# Patient Record
Sex: MALE | Race: BLACK OR AFRICAN AMERICAN | NOT HISPANIC OR LATINO | Employment: STUDENT | URBAN - METROPOLITAN AREA
[De-identification: names, ages, dates, MRNs, and addresses within clinical notes are randomized per-mention and may not be internally consistent; named-entity substitution may affect disease eponyms.]

---

## 2019-12-04 ENCOUNTER — OFFICE VISIT (OUTPATIENT)
Dept: FAMILY MEDICINE | Facility: CLINIC | Age: 19
End: 2019-12-04
Payer: COMMERCIAL

## 2019-12-04 ENCOUNTER — LAB VISIT (OUTPATIENT)
Dept: LAB | Facility: HOSPITAL | Age: 19
End: 2019-12-04
Attending: FAMILY MEDICINE
Payer: COMMERCIAL

## 2019-12-04 VITALS
TEMPERATURE: 98 F | WEIGHT: 156.31 LBS | BODY MASS INDEX: 22.38 KG/M2 | SYSTOLIC BLOOD PRESSURE: 122 MMHG | DIASTOLIC BLOOD PRESSURE: 82 MMHG | OXYGEN SATURATION: 96 % | HEART RATE: 68 BPM | HEIGHT: 70 IN

## 2019-12-04 DIAGNOSIS — Z00.00 ANNUAL PHYSICAL EXAM: Primary | ICD-10-CM

## 2019-12-04 DIAGNOSIS — Z23 ENCOUNTER FOR ADMINISTRATION OF VACCINE: ICD-10-CM

## 2019-12-04 DIAGNOSIS — Z11.3 SCREEN FOR STD (SEXUALLY TRANSMITTED DISEASE): ICD-10-CM

## 2019-12-04 DIAGNOSIS — J30.89 NON-SEASONAL ALLERGIC RHINITIS DUE TO OTHER ALLERGIC TRIGGER: ICD-10-CM

## 2019-12-04 DIAGNOSIS — Z00.00 ANNUAL PHYSICAL EXAM: ICD-10-CM

## 2019-12-04 LAB
ALBUMIN SERPL BCP-MCNC: 4.8 G/DL (ref 3.5–5.2)
ALP SERPL-CCNC: 56 U/L (ref 55–135)
ALT SERPL W/O P-5'-P-CCNC: 5 U/L (ref 10–44)
ANION GAP SERPL CALC-SCNC: 12 MMOL/L (ref 8–16)
AST SERPL-CCNC: 15 U/L (ref 10–40)
BILIRUB SERPL-MCNC: 1 MG/DL (ref 0.1–1)
BUN SERPL-MCNC: 15 MG/DL (ref 6–20)
CALCIUM SERPL-MCNC: 9.8 MG/DL (ref 8.7–10.5)
CHLORIDE SERPL-SCNC: 104 MMOL/L (ref 95–110)
CHOLEST SERPL-MCNC: 228 MG/DL (ref 120–199)
CHOLEST/HDLC SERPL: 4.5 {RATIO} (ref 2–5)
CO2 SERPL-SCNC: 24 MMOL/L (ref 23–29)
CREAT SERPL-MCNC: 1.1 MG/DL (ref 0.5–1.4)
ERYTHROCYTE [DISTWIDTH] IN BLOOD BY AUTOMATED COUNT: 11.4 % (ref 11.5–14.5)
EST. GFR  (AFRICAN AMERICAN): >60 ML/MIN/1.73 M^2
EST. GFR  (NON AFRICAN AMERICAN): >60 ML/MIN/1.73 M^2
ESTIMATED AVG GLUCOSE: 94 MG/DL (ref 68–131)
GLUCOSE SERPL-MCNC: 77 MG/DL (ref 70–110)
HBA1C MFR BLD HPLC: 4.9 % (ref 4–5.6)
HCT VFR BLD AUTO: 48.7 % (ref 40–54)
HDLC SERPL-MCNC: 51 MG/DL (ref 40–75)
HDLC SERPL: 22.4 % (ref 20–50)
HGB BLD-MCNC: 15.8 G/DL (ref 14–18)
LDLC SERPL CALC-MCNC: 164.2 MG/DL (ref 63–159)
MCH RBC QN AUTO: 30.5 PG (ref 27–31)
MCHC RBC AUTO-ENTMCNC: 32.4 G/DL (ref 32–36)
MCV RBC AUTO: 94 FL (ref 82–98)
NONHDLC SERPL-MCNC: 177 MG/DL
PLATELET # BLD AUTO: 189 K/UL (ref 150–350)
PMV BLD AUTO: 10.4 FL (ref 9.2–12.9)
POTASSIUM SERPL-SCNC: 4.1 MMOL/L (ref 3.5–5.1)
PROT SERPL-MCNC: 8.2 G/DL (ref 6–8.4)
RBC # BLD AUTO: 5.18 M/UL (ref 4.6–6.2)
SODIUM SERPL-SCNC: 140 MMOL/L (ref 136–145)
TRIGL SERPL-MCNC: 64 MG/DL (ref 30–150)
WBC # BLD AUTO: 4.38 K/UL (ref 3.9–12.7)

## 2019-12-04 PROCEDURE — 90651 9VHPV VACCINE 2/3 DOSE IM: CPT | Mod: S$GLB,,, | Performed by: FAMILY MEDICINE

## 2019-12-04 PROCEDURE — 86592 SYPHILIS TEST NON-TREP QUAL: CPT

## 2019-12-04 PROCEDURE — 99999 PR PBB SHADOW E&M-NEW PATIENT-LVL III: CPT | Mod: PBBFAC,,, | Performed by: FAMILY MEDICINE

## 2019-12-04 PROCEDURE — 90686 IIV4 VACC NO PRSV 0.5 ML IM: CPT | Mod: S$GLB,,, | Performed by: FAMILY MEDICINE

## 2019-12-04 PROCEDURE — 99385 PREV VISIT NEW AGE 18-39: CPT | Mod: 25,S$GLB,, | Performed by: FAMILY MEDICINE

## 2019-12-04 PROCEDURE — 90472 HPV VACCINE 9-VALENT 3 DOSE IM: ICD-10-PCS | Mod: S$GLB,,, | Performed by: FAMILY MEDICINE

## 2019-12-04 PROCEDURE — 90471 FLU VACCINE (QUAD) GREATER THAN OR EQUAL TO 3YO PRESERVATIVE FREE IM: ICD-10-PCS | Mod: S$GLB,,, | Performed by: FAMILY MEDICINE

## 2019-12-04 PROCEDURE — 83036 HEMOGLOBIN GLYCOSYLATED A1C: CPT

## 2019-12-04 PROCEDURE — 80061 LIPID PANEL: CPT

## 2019-12-04 PROCEDURE — 90472 IMMUNIZATION ADMIN EACH ADD: CPT | Mod: S$GLB,,, | Performed by: FAMILY MEDICINE

## 2019-12-04 PROCEDURE — 86803 HEPATITIS C AB TEST: CPT

## 2019-12-04 PROCEDURE — 80053 COMPREHEN METABOLIC PANEL: CPT

## 2019-12-04 PROCEDURE — 86703 HIV-1/HIV-2 1 RESULT ANTBDY: CPT

## 2019-12-04 PROCEDURE — 99999 PR PBB SHADOW E&M-NEW PATIENT-LVL III: ICD-10-PCS | Mod: PBBFAC,,, | Performed by: FAMILY MEDICINE

## 2019-12-04 PROCEDURE — 99385 PR PREVENTIVE VISIT,NEW,18-39: ICD-10-PCS | Mod: 25,S$GLB,, | Performed by: FAMILY MEDICINE

## 2019-12-04 PROCEDURE — 90686 FLU VACCINE (QUAD) GREATER THAN OR EQUAL TO 3YO PRESERVATIVE FREE IM: ICD-10-PCS | Mod: S$GLB,,, | Performed by: FAMILY MEDICINE

## 2019-12-04 PROCEDURE — 36415 COLL VENOUS BLD VENIPUNCTURE: CPT | Mod: PO

## 2019-12-04 PROCEDURE — 90471 IMMUNIZATION ADMIN: CPT | Mod: S$GLB,,, | Performed by: FAMILY MEDICINE

## 2019-12-04 PROCEDURE — 85027 COMPLETE CBC AUTOMATED: CPT

## 2019-12-04 PROCEDURE — 90651 HPV VACCINE 9-VALENT 3 DOSE IM: ICD-10-PCS | Mod: S$GLB,,, | Performed by: FAMILY MEDICINE

## 2019-12-04 NOTE — PROGRESS NOTES
Office Visit    Patient Name: Osvaldo Don    : 2000  MRN: [id number]      Assessment/Plan:  Osvaldo Don is a 19 y.o. male who presents today for :    Annual physical exam  -     Hemoglobin A1c; Future; Expected date: 2019  -     CBC Without Differential; Future; Expected date: 2019  -     Comprehensive metabolic panel; Future; Expected date: 2019  -     Lipid panel; Future; Expected date: 2019    Encounter for administration of vaccine  -     Influenza - Quadrivalent (6 months+) (PF)  -     (In Office Administered) HPV Vaccine (9-Valent) (3 Dose) (IM)    Screen for STD (sexually transmitted disease)  -     C. trachomatis/N. gonorrhoeae by AMP DNA; Future  -     Hepatitis C antibody; Future  -     HIV 1/2 Ag/Ab (4th Gen); Future  -     RPR; Future      ***-previous labs reviewed and discussed with patient  -anticipatory guidance provided with age appropriate preventative services discussed, healthy diet and regular physical exercise also discussed with patient  -any additional health maintenance will be readdressed at the next physical if declined or deferred by the patient today   ***-d/w pt about the importance of portion control  ***-Recommend 15-30 minutes of moderate intensity exercise 5 days/week.  ***-Patient was advised to get ***Td/Shingle  vaccines at the pharmacy.                  Follow up for any evaluation as needed.          Additional Evaluation & Management issues:     In addition to today's Annual Physical, patient has other medical issues that need to be addressed, as well as their associated prescription management that is separate from today's Physical  - as documented separately below the Annual Physical portion of this encounter.           This note was created by combination of typed  and Dragon dictation.  Transcription errors may be present.  If there are any questions, please contact  me.        ----------------------------------------------------------------------------------------------------------------------      HPI:  Patient Care Team:  Ayo Slater MD as PCP - General (Family Medicine)    Osvaldo is a 19 y.o. male with    ***  Patient Active Problem List   Diagnosis    Allergic rhinitis, cause unspecified    Exposure to STD    Left knee pain    Plica syndrome     ***no significant medical history  ***This patient is new to me       Patient presents today for:  Establish Care; Annual Exam; and Nasal Congestion      ***In addition to addressing the reason*** for this office visit as above, which is further discussed and addressed in the separate E&M section of this note, patient ***also overdue for annual bloodwork today.  ***desires to discuss the results of the most recent Annual Physical bloodwork today.  Health maintenance-wise,   ***she is due for ***colon cancer screening  ***MMG***/Pap ***and  ***she is up to date on ***colon cancer screening  ***MMG***/Pap***  ***, as well as age-appropriate vaccines.   ***she is due for the flu vaccine but declines it today    ***Otherwise, no major new changes in health since last checkup ***with prior PCP ***several years ago  *** except for acute issues that patient desires to be addressed today as noted in the separate E&M section of this note.  ***is well controlled without any recent flare ups - no side effects on ***medical therapy.  ***Patient reports that BP***FBG well controlled at home, compliant with ***medications daily without any adverse side effects.    Pt is trying to eat a health***ier diet to maintain a healthy weight***, with smaller portions and more fruits/vegetable daily.  ***Patient reports that patient has been attempting to reduce the red meat in diet as well as fats in daily meals.  ***Pt does ***not engag***e in physical exercises regularly ***due to a busy work schedule,   ***which I encourage patient to  incorporate into daily routine,   ***he denies any cardiovascular or neurologic complaints today        Additional ROS  No F/C/wt changes/fatigue  No dysphagia/sore throat/rhinorrhea  No CP/PRICE/palpitations/swelling  No cough/wheezing/SOB  No nausea/vomiting/abd pain/no diarrhea, no constipation, no blood in stool  No muscle aches/joint pain   No rashes  No MSK weakness/HA/tingling/numbness  No anxiety/depression  No dysuria/hematuria  No polyuria/polydipsia/fatigue/cold or hot intolerance          Current Medications  Medications reviewed and updated.     No current outpatient medications on file.    Past Surgical History:   Procedure Laterality Date    KNEE SURGERY         Family History   Problem Relation Age of Onset    Lupus Maternal Aunt     Melanoma Neg Hx     Psoriasis Neg Hx        Social History     Socioeconomic History    Marital status: Single     Spouse name: Not on file    Number of children: Not on file    Years of education: Not on file    Highest education level: Not on file   Occupational History    Occupation: Student   Social Needs    Financial resource strain: Not on file    Food insecurity:     Worry: Not on file     Inability: Not on file    Transportation needs:     Medical: Not on file     Non-medical: Not on file   Tobacco Use    Smoking status: Never Smoker   Substance and Sexual Activity    Alcohol use: No    Drug use: No    Sexual activity: Not on file   Lifestyle    Physical activity:     Days per week: Not on file     Minutes per session: Not on file    Stress: Not on file   Relationships    Social connections:     Talks on phone: Not on file     Gets together: Not on file     Attends Gnosticist service: Not on file     Active member of club or organization: Not on file     Attends meetings of clubs or organizations: Not on file     Relationship status: Not on file   Other Topics Concern    Not on file   Social History Narrative    Not on file           Allergies  "  Review of patient's allergies indicates:   Allergen Reactions    Ultram [tramadol] Anxiety     Severe agitation             Review of Systems  See HPI      Physical Exam  /82   Pulse 68   Temp 98.2 °F (36.8 °C)   Ht 5' 10" (1.778 m)   Wt 70.9 kg (156 lb 4.9 oz)   SpO2 96%   BMI 22.43 kg/m²     GEN: NAD, well developed, pleasant, well nourished  HEENT: NCAT, PERRLA, EOMI, sclera clear, anicteric, bilateral ear exam wnl, O/P clear, MMM with no lesions  NECK: normal, supple with midline trachea, no LAD, no thyromegaly  LUNGS: CTAB, no w/r/r, no increased work of breathing   HEART: RRR, normal S1 and S2, no m/r/g, no edema  ABD: s/nt/nd, NABS  SKIN: normal turgor, no rashes  PSYCH: AOx3, appropriate mood and affect  MSK: warm/well perfused, normal ROM in all extremities, no c/c/e.  NEURO: normal without focal findings, CN II-XII are grossly intact.  Sensation/strength grossly normal, gait and station normal.         Labs  No results found for: LABA1C, HGBA1C  No results found for: NA, K, CL, CO2, BUN, CREATININE, CALCIUM, ANIONGAP, ESTGFRAFRICA, EGFRNONAA  No results found for: CHOL  No results found for: HDL  No results found for: LDLCALC  No results found for: TRIG  No results found for: CHOLHDL  Last set of blood work has been reviewed as noted above.          __________________________________________________________________________________________________________________________________      Additional Evaluation & Management issues:     In addition to today's Annual Physical, patient has other medical issues that need to be addressed, as well as their associated prescription management that is separate from today's Physical  - as documented separately below      HPI:    Patient presents today for:  Establish Care; Annual Exam; and Nasal Congestion        ***          Additional ROS  No F/C/wt changes/fatigue  No CP/PRICE/palpitations/swelling  No cough/wheezing/SOB  No nausea/vomiting/abd pain  No " "muscle aches/joint pain   No rashes  No MSK weakness/HA/tingling/numbness              Review of Systems  See HPI        Physical Exam  /82   Pulse 68   Temp 98.2 °F (36.8 °C)   Ht 5' 10" (1.778 m)   Wt 70.9 kg (156 lb 4.9 oz)   SpO2 96%   BMI 22.43 kg/m²       GEN: NAD, well developed, pleasant, well nourished  HEENT: NCAT, PERRLA, EOMI, sclera clear, anicteric  NECK: normal, supple with midline trachea, no LAD, no thyromegaly  LUNGS: CTAB, no w/r/r, no increased work of breathing   HEART: RRR, normal S1 and S2, no m/r/g, no edema  ABD: s/nt/nd, NABS  SKIN: normal turgor, no rashes  PSYCH: AOx3, appropriate mood and affect  MSK: warm/well perfused, normal ROM in all extremities, no c/c/e.  ***                    Assessment/Plan:  Osvaldo Don is a 19 y.o. male who presents today for :    Annual physical exam  -     Hemoglobin A1c; Future; Expected date: 12/04/2019  -     CBC Without Differential; Future; Expected date: 12/04/2019  -     Comprehensive metabolic panel; Future; Expected date: 12/04/2019  -     Lipid panel; Future; Expected date: 12/04/2019    Encounter for administration of vaccine  -     Influenza - Quadrivalent (6 months+) (PF)  -     (In Office Administered) HPV Vaccine (9-Valent) (3 Dose) (IM)    Screen for STD (sexually transmitted disease)  -     C. trachomatis/N. gonorrhoeae by AMP DNA; Future  -     Hepatitis C antibody; Future  -     HIV 1/2 Ag/Ab (4th Gen); Future  -     RPR; Future                  Follow up ***for worsening Sx. Urgent care/ED precautions provided.  ***PRN.  "

## 2019-12-04 NOTE — PROGRESS NOTES
Office Visit    Patient Name: Osvaldo Don    : 2000  MRN: [id number]      Assessment/Plan:  Osvaldo Don is a 19 y.o. male who presents today for :    Annual physical exam  -     Hemoglobin A1c; Future; Expected date: 2019  -     CBC Without Differential; Future; Expected date: 2019  -     Comprehensive metabolic panel; Future; Expected date: 2019  -     Lipid panel; Future; Expected date: 2019  Encounter for administration of vaccine  -     Influenza - Quadrivalent (6 months+) (PF)  -     (In Office Administered) HPV Vaccine (9-Valent) (3 Dose) (IM)  Screen for STD (sexually transmitted disease)  -     C. trachomatis/N. gonorrhoeae by AMP DNA; Future  -     Hepatitis C antibody; Future  -     HIV 1/2 Ag/Ab (4th Gen); Future  -     RPR; Future  -anticipatory guidance provided with age appropriate preventative services discussed, healthy diet and regular physical exercise also discussed with patient  -any additional health maintenance will be readdressed at the next physical if declined or deferred by the patient today   -Recommend 15-30 minutes of moderate intensity exercise 5 days/week.    Non-seasonal allergic rhinitis due to other allergic trigger  -continue Zyrtec PRN daily  -use Flonase daily in the morning as needed  -nasal saline rinses twice daily as needed.  -discussed use of air humidifier/filter at home, changing AC filters regularly, avoid second-hand smoking.   -supportive therapy and symptomatic management.   -f/u as needed             Follow up PRN     This note was created by combination of typed  and Dragon dictation.  Transcription errors may be present.  If there are any questions, please contact me.      -advised patient to contact insurance to verify which lab facility is covered under patient's insurance plan. Pt verbalized understand and will call to notify if patient's lab orders need to be sent to a facility other than  [facility].      ----------------------------------------------------------------------------------------------------------------------      HPI:  Patient Care Team:  Ayo Salter MD as PCP - General (Family Medicine)    Osvaldo is a 19 y.o. male with      Patient Active Problem List   Diagnosis    Non-seasonal allergic rhinitis    Exposure to STD    Left knee pain    Plica syndrome     This patient is new to me, he desires to establish PCP care with me today.      Patient presents today for:  Establish Care and Annual Exam      Patient is doing well and has no major complaints today except for occasional nasal congestion, which he has a h/o Allergic rhinitis and takes Zyrtec and Flonase as needed.  Health maintenance-wise, he is due for HPV vaccine and Flu vaccine. Otherwise, no major new changes in health since last checkup several years ago.  He denies any cardiovascular or neurologic complaints today        Additional ROS  No F/C/wt changes/fatigue  No dysphagia/sore throat/rhinorrhea  No CP/PRICE/palpitations/swelling  No cough/wheezing/SOB  No nausea/vomiting/abd pain/no diarrhea, no constipation, no blood in stool  No muscle aches/joint pain   No rashes  No MSK weakness/HA/tingling/numbness  No anxiety/depression  No dysuria/hematuria  No polyuria/polydipsia/fatigue/cold or hot intolerance          Current Medications  Medications reviewed and updated.     No current outpatient medications on file.    Past Surgical History:   Procedure Laterality Date    KNEE SURGERY         Family History   Problem Relation Age of Onset    Lupus Maternal Aunt     Melanoma Neg Hx     Psoriasis Neg Hx        Social History     Socioeconomic History    Marital status: Single     Spouse name: Not on file    Number of children: Not on file    Years of education: Not on file    Highest education level: Not on file   Occupational History    Occupation: Student   Social Needs    Financial resource strain: Not on file  "   Food insecurity:     Worry: Not on file     Inability: Not on file    Transportation needs:     Medical: Not on file     Non-medical: Not on file   Tobacco Use    Smoking status: Never Smoker   Substance and Sexual Activity    Alcohol use: No    Drug use: No    Sexual activity: Not on file   Lifestyle    Physical activity:     Days per week: Not on file     Minutes per session: Not on file    Stress: Not on file   Relationships    Social connections:     Talks on phone: Not on file     Gets together: Not on file     Attends Latter-day service: Not on file     Active member of club or organization: Not on file     Attends meetings of clubs or organizations: Not on file     Relationship status: Not on file   Other Topics Concern    Not on file   Social History Narrative    Not on file           Allergies   Review of patient's allergies indicates:   Allergen Reactions    Ultram [tramadol] Anxiety     Severe agitation             Review of Systems  See HPI      Physical Exam  /82   Pulse 68   Temp 98.2 °F (36.8 °C)   Ht 5' 10" (1.778 m)   Wt 70.9 kg (156 lb 4.9 oz)   SpO2 96%   BMI 22.43 kg/m²     GEN: NAD, well developed, pleasant, well nourished  HEENT: NCAT, PERRLA, EOMI, sclera clear, anicteric, bilateral ear exam wnl, O/P clear, MMM with no lesions  NECK: normal, supple with midline trachea, no LAD, no thyromegaly  LUNGS: CTAB, no w/r/r, no increased work of breathing   HEART: RRR, normal S1 and S2, no m/r/g, no edema  ABD: s/nt/nd, NABS  SKIN: normal turgor, no rashes  PSYCH: AOx3, appropriate mood and affect  MSK: warm/well perfused, normal ROM in all extremities, no c/c/e.  NEURO: normal without focal findings, CN II-XII are grossly intact.  Sensation/strength grossly normal, gait and station normal.           "

## 2019-12-05 LAB
HCV AB SERPL QL IA: NEGATIVE
HIV 1+2 AB+HIV1 P24 AG SERPL QL IA: NEGATIVE
RPR SER QL: NORMAL

## 2020-03-05 ENCOUNTER — HOSPITAL ENCOUNTER (EMERGENCY)
Facility: HOSPITAL | Age: 20
Discharge: PSYCHIATRIC OR SUBSTANCE USE FACILITY | End: 2020-03-05
Attending: EMERGENCY MEDICINE
Payer: COMMERCIAL

## 2020-03-05 VITALS
SYSTOLIC BLOOD PRESSURE: 112 MMHG | HEART RATE: 82 BPM | OXYGEN SATURATION: 98 % | TEMPERATURE: 99 F | DIASTOLIC BLOOD PRESSURE: 62 MMHG | RESPIRATION RATE: 20 BRPM

## 2020-03-05 DIAGNOSIS — Z00.8 MEDICAL CLEARANCE FOR PSYCHIATRIC ADMISSION: ICD-10-CM

## 2020-03-05 LAB
ALBUMIN SERPL BCP-MCNC: 4.6 G/DL (ref 3.5–5.2)
ALP SERPL-CCNC: 43 U/L (ref 55–135)
ALT SERPL W/O P-5'-P-CCNC: 9 U/L (ref 10–44)
AMPHET+METHAMPHET UR QL: NEGATIVE
ANION GAP SERPL CALC-SCNC: 13 MMOL/L (ref 8–16)
APAP SERPL-MCNC: <3 UG/ML (ref 10–20)
AST SERPL-CCNC: 32 U/L (ref 10–40)
BARBITURATES UR QL SCN>200 NG/ML: NEGATIVE
BASOPHILS # BLD AUTO: 0.05 K/UL (ref 0–0.2)
BASOPHILS NFR BLD: 0.8 % (ref 0–1.9)
BENZODIAZ UR QL SCN>200 NG/ML: NORMAL
BILIRUB SERPL-MCNC: 0.5 MG/DL (ref 0.1–1)
BILIRUB UR QL STRIP: NEGATIVE
BUN SERPL-MCNC: 16 MG/DL (ref 6–20)
BZE UR QL SCN: NEGATIVE
CALCIUM SERPL-MCNC: 9.2 MG/DL (ref 8.7–10.5)
CANNABINOIDS UR QL SCN: NORMAL
CHLORIDE SERPL-SCNC: 104 MMOL/L (ref 95–110)
CLARITY UR: CLEAR
CO2 SERPL-SCNC: 21 MMOL/L (ref 23–29)
COLOR UR: NORMAL
CREAT SERPL-MCNC: 1.1 MG/DL (ref 0.5–1.4)
CREAT UR-MCNC: 69.8 MG/DL (ref 23–375)
DIFFERENTIAL METHOD: ABNORMAL
EOSINOPHIL # BLD AUTO: 0.1 K/UL (ref 0–0.5)
EOSINOPHIL NFR BLD: 1.9 % (ref 0–8)
ERYTHROCYTE [DISTWIDTH] IN BLOOD BY AUTOMATED COUNT: 11.1 % (ref 11.5–14.5)
EST. GFR  (AFRICAN AMERICAN): >60 ML/MIN/1.73 M^2
EST. GFR  (NON AFRICAN AMERICAN): >60 ML/MIN/1.73 M^2
ETHANOL SERPL-MCNC: <10 MG/DL
GLUCOSE SERPL-MCNC: 107 MG/DL (ref 70–110)
GLUCOSE UR QL STRIP: NEGATIVE
HCT VFR BLD AUTO: 39.6 % (ref 40–54)
HGB BLD-MCNC: 13.3 G/DL (ref 14–18)
HGB UR QL STRIP: NEGATIVE
IMM GRANULOCYTES # BLD AUTO: 0.02 K/UL (ref 0–0.04)
IMM GRANULOCYTES NFR BLD AUTO: 0.3 % (ref 0–0.5)
KETONES UR QL STRIP: NEGATIVE
LEUKOCYTE ESTERASE UR QL STRIP: NEGATIVE
LYMPHOCYTES # BLD AUTO: 1.9 K/UL (ref 1–4.8)
LYMPHOCYTES NFR BLD: 30 % (ref 18–48)
MCH RBC QN AUTO: 29.9 PG (ref 27–31)
MCHC RBC AUTO-ENTMCNC: 33.6 G/DL (ref 32–36)
MCV RBC AUTO: 89 FL (ref 82–98)
METHADONE UR QL SCN>300 NG/ML: NEGATIVE
MONOCYTES # BLD AUTO: 0.7 K/UL (ref 0.3–1)
MONOCYTES NFR BLD: 10.7 % (ref 4–15)
NEUTROPHILS # BLD AUTO: 3.7 K/UL (ref 1.8–7.7)
NEUTROPHILS NFR BLD: 56.3 % (ref 38–73)
NITRITE UR QL STRIP: NEGATIVE
NRBC BLD-RTO: 0 /100 WBC
OPIATES UR QL SCN: NEGATIVE
PCP UR QL SCN>25 NG/ML: NEGATIVE
PH UR STRIP: 6 [PH] (ref 5–8)
PLATELET # BLD AUTO: 166 K/UL (ref 150–350)
PMV BLD AUTO: 10.1 FL (ref 9.2–12.9)
POTASSIUM SERPL-SCNC: 3.6 MMOL/L (ref 3.5–5.1)
PROT SERPL-MCNC: 7 G/DL (ref 6–8.4)
PROT UR QL STRIP: NEGATIVE
RBC # BLD AUTO: 4.45 M/UL (ref 4.6–6.2)
SODIUM SERPL-SCNC: 138 MMOL/L (ref 136–145)
SP GR UR STRIP: 1 (ref 1–1.03)
TOXICOLOGY INFORMATION: NORMAL
TSH SERPL DL<=0.005 MIU/L-ACNC: 1.87 UIU/ML (ref 0.4–4)
URN SPEC COLLECT METH UR: NORMAL
UROBILINOGEN UR STRIP-ACNC: NEGATIVE EU/DL
WBC # BLD AUTO: 6.47 K/UL (ref 3.9–12.7)

## 2020-03-05 PROCEDURE — 80307 DRUG TEST PRSMV CHEM ANLYZR: CPT

## 2020-03-05 PROCEDURE — 99285 EMERGENCY DEPT VISIT HI MDM: CPT | Mod: 25

## 2020-03-05 PROCEDURE — 93005 ELECTROCARDIOGRAM TRACING: CPT

## 2020-03-05 PROCEDURE — 63600175 PHARM REV CODE 636 W HCPCS: Performed by: EMERGENCY MEDICINE

## 2020-03-05 PROCEDURE — 80320 DRUG SCREEN QUANTALCOHOLS: CPT

## 2020-03-05 PROCEDURE — 80329 ANALGESICS NON-OPIOID 1 OR 2: CPT

## 2020-03-05 PROCEDURE — 81003 URINALYSIS AUTO W/O SCOPE: CPT | Mod: 59

## 2020-03-05 PROCEDURE — 93010 ELECTROCARDIOGRAM REPORT: CPT | Mod: ,,, | Performed by: INTERNAL MEDICINE

## 2020-03-05 PROCEDURE — 80053 COMPREHEN METABOLIC PANEL: CPT

## 2020-03-05 PROCEDURE — 93010 EKG 12-LEAD: ICD-10-PCS | Mod: ,,, | Performed by: INTERNAL MEDICINE

## 2020-03-05 PROCEDURE — 96372 THER/PROPH/DIAG INJ SC/IM: CPT

## 2020-03-05 PROCEDURE — 84443 ASSAY THYROID STIM HORMONE: CPT

## 2020-03-05 PROCEDURE — 85025 COMPLETE CBC W/AUTO DIFF WBC: CPT

## 2020-03-05 RX ORDER — HALOPERIDOL 5 MG/ML
5 INJECTION INTRAMUSCULAR
Status: COMPLETED | OUTPATIENT
Start: 2020-03-05 | End: 2020-03-05

## 2020-03-05 RX ORDER — LORAZEPAM 2 MG/ML
2 INJECTION INTRAMUSCULAR
Status: COMPLETED | OUTPATIENT
Start: 2020-03-05 | End: 2020-03-05

## 2020-03-05 RX ORDER — DIPHENHYDRAMINE HYDROCHLORIDE 50 MG/ML
50 INJECTION INTRAMUSCULAR; INTRAVENOUS
Status: COMPLETED | OUTPATIENT
Start: 2020-03-05 | End: 2020-03-05

## 2020-03-05 RX ADMIN — HALOPERIDOL LACTATE 5 MG: 5 INJECTION, SOLUTION INTRAMUSCULAR at 12:03

## 2020-03-05 RX ADMIN — DIPHENHYDRAMINE HYDROCHLORIDE 50 MG: 50 INJECTION INTRAMUSCULAR; INTRAVENOUS at 12:03

## 2020-03-05 RX ADMIN — LORAZEPAM 2 MG: 2 INJECTION INTRAMUSCULAR; INTRAVENOUS at 12:03

## 2020-03-05 NOTE — ED NOTES
PATIENT ESCORTED TO PSYCH FACILITY BY [facility] (UNIT[facility][id number]),AND SECURITY.

## 2020-03-05 NOTE — ED TRIAGE NOTES
per ems patient was out with friends and started acting differently. friends unsure if patient took anything. patient currently talking to himself

## 2020-03-05 NOTE — ED PROVIDER NOTES
Encounter Date: 3/5/2020    SCRIBE #1 NOTE: I, Venessa Larose, am scribing for, and in the presence of,  Narciso Manzo MD. I have scribed the following portions of the note - Other sections scribed: HPI, ROS, PE.       History     Chief Complaint   Patient presents with    Psychiatric Evaluation     per ems patiemt was out with friend and started acting differently, patient currently talking to himself     CC: Psychiatric Evaluation    Patient is a 19 y.o male who presents to the ED, per EMS, for behavioral problem that began this afternoon. Per EMS personnel, patient's family reported of erratic behavior when he came home this afternoon. Family found in the street diaphoretic. EMS states that he was informed that he will have to go to the ED. Patient became violent and restraints were place. Per police, patient reportedly went into work, quit, and returned home. Patient is allergic to Tramadol. No reported Hx or FH of psychatric disorder. History is limited as patient is uncooperative.       The history is provided by the patient, the EMS personnel and the police. The history is limited by the condition of the patient. No  was used.     Review of patient's allergies indicates:   Allergen Reactions    Ultram [tramadol] Anxiety     Severe agitation     Past Medical History:   Diagnosis Date    Allergy     Asthma     Fever blister      Past Surgical History:   Procedure Laterality Date    KNEE SURGERY       Family History   Problem Relation Age of Onset    Lupus Maternal Aunt     Melanoma Neg Hx     Psoriasis Neg Hx      Social History     Tobacco Use    Smoking status: Never Smoker   Substance Use Topics    Alcohol use: No    Drug use: No     Review of Systems   Unable to perform ROS: Other (Patient is uncooperative)   Psychiatric/Behavioral: Positive for behavioral problems.       Physical Exam     Initial Vitals [03/05/20 0207]   BP Pulse Resp Temp SpO2   (!) 104/59 61 -- 97.3 °F  (36.3 °C) 97 %      MAP       --         Physical Exam    Nursing note and vitals reviewed.  Constitutional: He appears well-developed and well-nourished. He is not diaphoretic. No distress.   HENT:   Head: Normocephalic and atraumatic.   Right Ear: External ear normal.   Left Ear: External ear normal.   Eyes: Conjunctivae and EOM are normal. Pupils are equal, round, and reactive to light. Right eye exhibits no discharge. Left eye exhibits no discharge.   Neck: Normal range of motion. No tracheal deviation present.   Cardiovascular: Normal rate and regular rhythm.   Pulmonary/Chest: No stridor. No respiratory distress.   Abdominal: He exhibits no distension. There is no guarding.   Musculoskeletal: Normal range of motion. He exhibits no edema.   Neurological: He is alert and oriented to person, place, and time. He has normal strength. GCS score is 15. GCS eye subscore is 4. GCS verbal subscore is 5. GCS motor subscore is 6.   Skin: Skin is warm and dry. No rash noted.   Psychiatric:   Patient is uncooperative, agitated,         ED Course   Procedures  Labs Reviewed   CBC W/ AUTO DIFFERENTIAL - Abnormal; Notable for the following components:       Result Value    RBC 4.45 (*)     Hemoglobin 13.3 (*)     Hematocrit 39.6 (*)     RDW 11.1 (*)     All other components within normal limits   COMPREHENSIVE METABOLIC PANEL - Abnormal; Notable for the following components:    CO2 21 (*)     Alkaline Phosphatase 43 (*)     ALT 9 (*)     All other components within normal limits   ACETAMINOPHEN LEVEL - Abnormal; Notable for the following components:    Acetaminophen (Tylenol), Serum <3.0 (*)     All other components within normal limits   TSH   URINALYSIS, REFLEX TO URINE CULTURE    Narrative:     Preferred Collection Type->Urine, Clean Catch   DRUG SCREEN PANEL, URINE EMERGENCY    Narrative:     Preferred Collection Type->Urine, Clean Catch   ALCOHOL,MEDICAL (ETHANOL)     EKG Readings: (Independently Interpreted)   Initial  Reading: No STEMI. Rhythm: Normal Sinus Rhythm. Heart Rate: 65. Ectopy: No Ectopy. Conduction: Normal.       Imaging Results    None          Medical Decision Making:   Initial Assessment:   19-year-old male presenting with acute psychosis.  Reportedly agitated, combative, with bizarre behavior including quitting his job coming home and wandering the streets.  Police and EMS were called.  No reported history of psychiatric illness.  Patient is uncooperative with exam, repeating questions, and somewhat hostile.  Denies allergies or drug use.  Given hostility, patient was sedated for patient and faculty safety.  Laboratory workup was unremarkable. Patient appears gravely disabled and is danger to himself and others.  Patient is being PEC'd will be transferred to a mental health facility when medically clear.  Independently Interpreted Test(s):   I have ordered and independently interpreted EKG Reading(s) - see prior notes  Clinical Tests:   Lab Tests: Ordered and Reviewed  Medical Tests: Ordered and Reviewed  ED Management:  Patient is medically clear for transfer to psych facility.            Scribe Attestation:   Scribe #1: I performed the above scribed service and the documentation accurately describes the services I performed. I attest to the accuracy of the note.                          Clinical Impression:       ICD-10-CM ICD-9-CM   1. Medical clearance for psychiatric admission Z00.8 V70.8         Disposition:   Disposition: Transferred  Condition: Stable           I, Narciso Manzo, personally performed the services described in this documentation. All medical record entries made by the scribe were at my direction and in my presence.  I have reviewed the chart and agree that the record reflects my personal performance and is accurate and complete.               Narciso Manzo MD  03/05/20 0315

## 2021-02-10 ENCOUNTER — LAB VISIT (OUTPATIENT)
Dept: LAB | Facility: HOSPITAL | Age: 21
End: 2021-02-10
Attending: FAMILY MEDICINE
Payer: MEDICAID

## 2021-02-10 ENCOUNTER — OFFICE VISIT (OUTPATIENT)
Dept: FAMILY MEDICINE | Facility: CLINIC | Age: 21
End: 2021-02-10
Payer: MEDICAID

## 2021-02-10 VITALS
WEIGHT: 156.75 LBS | DIASTOLIC BLOOD PRESSURE: 70 MMHG | RESPIRATION RATE: 18 BRPM | SYSTOLIC BLOOD PRESSURE: 105 MMHG | HEART RATE: 66 BPM | BODY MASS INDEX: 23.22 KG/M2 | OXYGEN SATURATION: 99 % | HEIGHT: 69 IN

## 2021-02-10 DIAGNOSIS — Z00.00 ANNUAL PHYSICAL EXAM: ICD-10-CM

## 2021-02-10 DIAGNOSIS — Z23 ENCOUNTER FOR ADMINISTRATION OF VACCINE: ICD-10-CM

## 2021-02-10 DIAGNOSIS — Z00.00 ANNUAL PHYSICAL EXAM: Primary | ICD-10-CM

## 2021-02-10 DIAGNOSIS — F43.10 PTSD (POST-TRAUMATIC STRESS DISORDER): ICD-10-CM

## 2021-02-10 LAB
ALBUMIN SERPL BCP-MCNC: 3.9 G/DL (ref 3.5–5.2)
ALP SERPL-CCNC: 43 U/L (ref 55–135)
ALT SERPL W/O P-5'-P-CCNC: 8 U/L (ref 10–44)
ANION GAP SERPL CALC-SCNC: 8 MMOL/L (ref 8–16)
AST SERPL-CCNC: 17 U/L (ref 10–40)
BILIRUB SERPL-MCNC: 0.3 MG/DL (ref 0.1–1)
BUN SERPL-MCNC: 9 MG/DL (ref 6–20)
CALCIUM SERPL-MCNC: 8.3 MG/DL (ref 8.7–10.5)
CHLORIDE SERPL-SCNC: 107 MMOL/L (ref 95–110)
CHOLEST SERPL-MCNC: 156 MG/DL (ref 120–199)
CHOLEST/HDLC SERPL: 3.3 {RATIO} (ref 2–5)
CO2 SERPL-SCNC: 26 MMOL/L (ref 23–29)
CREAT SERPL-MCNC: 0.9 MG/DL (ref 0.5–1.4)
ERYTHROCYTE [DISTWIDTH] IN BLOOD BY AUTOMATED COUNT: 11.9 % (ref 11.5–14.5)
EST. GFR  (AFRICAN AMERICAN): >60 ML/MIN/1.73 M^2
EST. GFR  (NON AFRICAN AMERICAN): >60 ML/MIN/1.73 M^2
ESTIMATED AVG GLUCOSE: 100 MG/DL (ref 68–131)
GLUCOSE SERPL-MCNC: 86 MG/DL (ref 70–110)
HBA1C MFR BLD: 5.1 % (ref 4–5.6)
HCT VFR BLD AUTO: 40.5 % (ref 40–54)
HDLC SERPL-MCNC: 47 MG/DL (ref 40–75)
HDLC SERPL: 30.1 % (ref 20–50)
HGB BLD-MCNC: 13 G/DL (ref 14–18)
LDLC SERPL CALC-MCNC: 99.8 MG/DL (ref 63–159)
MCH RBC QN AUTO: 30.2 PG (ref 27–31)
MCHC RBC AUTO-ENTMCNC: 32.1 G/DL (ref 32–36)
MCV RBC AUTO: 94 FL (ref 82–98)
NONHDLC SERPL-MCNC: 109 MG/DL
PLATELET # BLD AUTO: 181 K/UL (ref 150–350)
PMV BLD AUTO: 10.4 FL (ref 9.2–12.9)
POTASSIUM SERPL-SCNC: 4.4 MMOL/L (ref 3.5–5.1)
PROT SERPL-MCNC: 6.6 G/DL (ref 6–8.4)
RBC # BLD AUTO: 4.31 M/UL (ref 4.6–6.2)
SODIUM SERPL-SCNC: 141 MMOL/L (ref 136–145)
TRIGL SERPL-MCNC: 46 MG/DL (ref 30–150)
WBC # BLD AUTO: 4.66 K/UL (ref 3.9–12.7)

## 2021-02-10 PROCEDURE — 90471 IMMUNIZATION ADMIN: CPT | Mod: PBBFAC,PO

## 2021-02-10 PROCEDURE — 85027 COMPLETE CBC AUTOMATED: CPT

## 2021-02-10 PROCEDURE — 99213 OFFICE O/P EST LOW 20 MIN: CPT | Mod: PBBFAC,PO,25 | Performed by: FAMILY MEDICINE

## 2021-02-10 PROCEDURE — 80061 LIPID PANEL: CPT

## 2021-02-10 PROCEDURE — 99395 PR PREVENTIVE VISIT,EST,18-39: ICD-10-PCS | Mod: S$PBB,,, | Performed by: FAMILY MEDICINE

## 2021-02-10 PROCEDURE — 90714 TD VACC NO PRESV 7 YRS+ IM: CPT | Mod: PBBFAC,PO

## 2021-02-10 PROCEDURE — 90651 9VHPV VACCINE 2/3 DOSE IM: CPT | Mod: PBBFAC,PO

## 2021-02-10 PROCEDURE — 99999 PR PBB SHADOW E&M-EST. PATIENT-LVL III: ICD-10-PCS | Mod: PBBFAC,,, | Performed by: FAMILY MEDICINE

## 2021-02-10 PROCEDURE — 83036 HEMOGLOBIN GLYCOSYLATED A1C: CPT

## 2021-02-10 PROCEDURE — 36415 COLL VENOUS BLD VENIPUNCTURE: CPT | Mod: PO

## 2021-02-10 PROCEDURE — 80053 COMPREHEN METABOLIC PANEL: CPT

## 2021-02-10 PROCEDURE — 99999 PR PBB SHADOW E&M-EST. PATIENT-LVL III: CPT | Mod: PBBFAC,,, | Performed by: FAMILY MEDICINE

## 2021-02-10 PROCEDURE — 99395 PREV VISIT EST AGE 18-39: CPT | Mod: S$PBB,,, | Performed by: FAMILY MEDICINE

## 2021-12-29 ENCOUNTER — TELEPHONE (OUTPATIENT)
Dept: ORTHOPEDICS | Facility: CLINIC | Age: 21
End: 2021-12-29
Payer: COMMERCIAL

## 2021-12-30 ENCOUNTER — TELEPHONE (OUTPATIENT)
Dept: ORTHOPEDICS | Facility: CLINIC | Age: 21
End: 2021-12-30
Payer: COMMERCIAL

## 2022-01-03 ENCOUNTER — HOSPITAL ENCOUNTER (OUTPATIENT)
Dept: RADIOLOGY | Facility: OTHER | Age: 22
Discharge: HOME | End: 2022-01-03
Attending: PHYSICIAN ASSISTANT
Payer: COMMERCIAL

## 2022-01-03 ENCOUNTER — OFFICE VISIT (OUTPATIENT)
Dept: ORTHOPEDICS | Facility: CLINIC | Age: 22
End: 2022-01-03
Payer: COMMERCIAL

## 2022-01-03 ENCOUNTER — TELEPHONE (OUTPATIENT)
Dept: ORTHOPEDICS | Facility: CLINIC | Age: 22
End: 2022-01-03

## 2022-01-03 ENCOUNTER — ANESTHESIA EVENT (OUTPATIENT)
Dept: SURGERY | Facility: HOSPITAL | Age: 22
End: 2022-01-03
Payer: COMMERCIAL

## 2022-01-03 VITALS — WEIGHT: 156 LBS | HEIGHT: 69 IN | BODY MASS INDEX: 23.11 KG/M2

## 2022-01-03 DIAGNOSIS — Z01.818 PRE-OP TESTING: ICD-10-CM

## 2022-01-03 DIAGNOSIS — S69.90XA INJURY OF HAND, UNSPECIFIED LATERALITY, INITIAL ENCOUNTER: ICD-10-CM

## 2022-01-03 DIAGNOSIS — S63.054A CMC (CARPOMETACARPAL JOINT) DISLOCATION, RIGHT, INITIAL ENCOUNTER: Primary | ICD-10-CM

## 2022-01-03 PROCEDURE — 73130 X-RAY EXAM OF HAND: CPT | Mod: TC,50,FY

## 2022-01-03 PROCEDURE — 3008F PR BODY MASS INDEX (BMI) DOCUMENTED: ICD-10-PCS | Mod: CPTII,S$GLB,, | Performed by: PHYSICIAN ASSISTANT

## 2022-01-03 PROCEDURE — 3008F BODY MASS INDEX DOCD: CPT | Mod: CPTII,S$GLB,, | Performed by: PHYSICIAN ASSISTANT

## 2022-01-03 PROCEDURE — 99204 PR OFFICE/OUTPT VISIT, NEW, LEVL IV, 45-59 MIN: ICD-10-PCS | Mod: S$GLB,,, | Performed by: PHYSICIAN ASSISTANT

## 2022-01-03 PROCEDURE — 99999 PR PBB SHADOW E&M-EST. PATIENT-LVL III: ICD-10-PCS | Mod: PBBFAC,,, | Performed by: PHYSICIAN ASSISTANT

## 2022-01-03 PROCEDURE — 73130 XR HAND COMPLETE 3 VIEWS BILATERAL: ICD-10-PCS | Mod: 26,,, | Performed by: RADIOLOGY

## 2022-01-03 PROCEDURE — 99204 OFFICE O/P NEW MOD 45 MIN: CPT | Mod: S$GLB,,, | Performed by: PHYSICIAN ASSISTANT

## 2022-01-03 PROCEDURE — 1159F MED LIST DOCD IN RCRD: CPT | Mod: CPTII,S$GLB,, | Performed by: PHYSICIAN ASSISTANT

## 2022-01-03 PROCEDURE — 99999 PR PBB SHADOW E&M-EST. PATIENT-LVL III: CPT | Mod: PBBFAC,,, | Performed by: PHYSICIAN ASSISTANT

## 2022-01-03 PROCEDURE — 73130 X-RAY EXAM OF HAND: CPT | Mod: 26,,, | Performed by: RADIOLOGY

## 2022-01-03 PROCEDURE — 1159F PR MEDICATION LIST DOCUMENTED IN MEDICAL RECORD: ICD-10-PCS | Mod: CPTII,S$GLB,, | Performed by: PHYSICIAN ASSISTANT

## 2022-01-03 RX ORDER — MUPIROCIN 20 MG/G
OINTMENT TOPICAL
Status: CANCELLED | OUTPATIENT
Start: 2022-01-03

## 2022-01-03 NOTE — ANESTHESIA PREPROCEDURE EVALUATION
Pre-operative evaluation for ORIF, FRACTURE, METACARPAL BONE - RIGHT 4th/5th CMC. Osteomed (Right)    Patient Active Problem List   Diagnosis    Non-seasonal allergic rhinitis    CMC (carpometacarpal joint) dislocation, right, initial encounter        Past Surgical History:   Procedure Laterality Date    KNEE SURGERY           Vital Signs Range (Last 24H):  Temp:  [36.8 °C (98.2 °F)]   Pulse:  [49]   Resp:  [18]   BP: (112)/(54)   SpO2:  [100 %]       EKG:   Results for orders placed or performed during the hospital encounter of 03/05/20   EKG 12-lead    Collection Time: 03/05/20  2:02 AM    Narrative    Test Reason : Z00.8,    Vent. Rate : 065 BPM     Atrial Rate : 065 BPM     P-R Int : 176 ms          QRS Dur : 088 ms      QT Int : 414 ms       P-R-T Axes : 073 090 069 degrees     QTc Int : 430 ms    Normal sinus rhythm with sinus arrhythmia  Rightward axis  Early repolarization  Borderline Abnormal ECG  No previous ECGs available  Confirmed by Joshua Tejada MD ([id number]) on 3/6/2020 12:09:13 AM    Referred By: [email or web address]   SELF           Confirmed By:Joshua Tejada MD            Anesthesia Evaluation    I have reviewed the Patient Summary Reports.     I have reviewed the Nursing Notes. I have reviewed the NPO Status.   I have reviewed the Medications.     Review of Systems  Anesthesia Hx:  Denies Family Hx of Anesthesia complications.   Denies Personal Hx of Anesthesia complications.   Cardiovascular:   Exercise tolerance: good Denies Hypertension.  Denies Valvular problems/Murmurs.  Denies MI.  Denies CAD.    Denies CABG/stent.  Denies Dysrhythmias.   Denies Angina.          Functional Capacity 4 METS    Pulmonary:   Denies COPD. Asthma  Denies Sleep Apnea.    Renal/:   Denies Chronic Renal Disease.     Hepatic/GI:   Denies GERD.    Neurological:   Denies TIA. Denies CVA. Denies Seizures.    Endocrine:   Denies Diabetes.        Physical Exam  General:  Well nourished    Airway/Jaw/Neck:  Airway Findings: Mouth  Opening: Normal Tongue: Normal  General Airway Assessment: Adult  Mallampati: II  TM Distance: Normal, at least 6 cm  Jaw/Neck Findings:  Neck ROM: Normal ROM       Chest/Lungs:  Chest/Lungs Findings: Clear to auscultation     Heart/Vascular:  Heart Findings: Rate: Normal  Rhythm: Regular Rhythm  Sounds: Normal  Heart murmur: negative            Anesthesia Plan  Type of Anesthesia, risks & benefits discussed:  Anesthesia Type:  MAC, regional    Patient's Preference:   Plan Factors:          Intra-op Monitoring Plan: standard ASA monitors  Intra-op Monitoring Plan Comments:   Post Op Pain Control Plan: multimodal analgesia, IV/PO Opioids PRN, per primary service following discharge from PACU and peripheral nerve block  Post Op Pain Control Plan Comments:     Induction:   IV  Beta Blocker:  Patient is not currently on a Beta-Blocker (No further documentation required).       Informed Consent: Patient understands risks and agrees with Anesthesia plan.  Questions answered. Anesthesia consent signed with patient.  ASA Score: 1     Day of Surgery Review of History & Physical:    H&P update referred to the provider.         Ready For Surgery From Anesthesia Perspective.

## 2022-01-03 NOTE — PROGRESS NOTES
[facility]  History & Physical  Orthopedics    SUBJECTIVE:      COVID-19 attestation:  This patient was treated during the COVID-19 pandemic.  This was discussed with the patient, they are aware of our current policies and procedures, were given the option of delaying their visit and or switching to a virtual visit, delaying their surgery when applicable, and they elect to proceed.    Chief Complaint: Right hand injury    Referring Provider: Self, Aaareferral     History of Present Illness:  Patient is a 21 y.o. right hand dominant male who presents today with complaints of right hand injury 2 months ago, after punching a wall. He did not seek any medical attention at the time, just dealt with the pain and deformity. He did see Panfilo Mercado last week who took xrays and recommended surgery, however patient did not feel good about it. He reports 6/10 only with weight bearing activity, otherwise no pain. He denies any numbness/tingling.     The patient is a/an .    Onset of symptoms/DOI was 2 months.    Symptoms are aggravated by activity and movement.    Symptoms are alleviated by rest.    Symptoms consist of deformity.    The patient rates their pain as a 0/10.    Attempted treatment(s) and/or interventions include activity modifications, rest, rest.     The patient denies any fevers, chills, N/V, D/C and presents for evaluation.       Past Medical History:   Diagnosis Date    Allergy     Asthma     Fever blister      Past Surgical History:   Procedure Laterality Date    KNEE SURGERY       Review of patient's allergies indicates:   Allergen Reactions    Ultram [tramadol] Anxiety     Severe agitation     Social History     Social History Narrative    Not on file     Family History   Problem Relation Age of Onset    Lupus Maternal Aunt     Melanoma Neg Hx     Psoriasis Neg Hx        No current outpatient medications on file.      Review of Systems:  Constitutional: no  "fever or chills  Eyes: no visual changes  ENT: no nasal congestion or sore throat  Respiratory: no cough or shortness of breath  Cardiovascular: no chest pain  Gastrointestinal: no nausea or vomiting, tolerating diet  Musculoskeletal: soreness    OBJECTIVE:      Vital Signs (Most Recent):  Vitals:    01/03/22 0826   Weight: 70.8 kg (156 lb)   Height: 5' 9" (1.753 m)     Body mass index is 23.04 kg/m².      Physical Exam:  Constitutional: The patient appears well-developed and well-nourished. No distress.   Skin: No lesions appreciated  Head: Normocephalic and atraumatic.   Nose: Nose normal.   Ears: No deformities seen  Eyes: Conjunctivae and EOM are normal.   Neck: No tracheal deviation present.   Cardiovascular: Normal rate and intact distal pulses.    Pulmonary/Chest: Effort normal. No respiratory distress.   Abdominal: There is no guarding.   Neurological: The patient is alert.   Psychiatric: The patient has a normal mood and affect.     Right Hand/Wrist Examination:    Observation/Inspection:  Swelling  none    Deformity  POS over 4th/5th CMC  Discoloration  none     Scars   none    Atrophy  none    HAND/WRIST EXAMINATION:  Finkelstein's Test   Neg  WHAT Test    Neg  Snuff box tenderness   Neg  Mcgee's Test    Neg  Hook of Hamate Tenderness  Neg  CMC grind    Neg  Circumduction test   Neg  NTTP over the 4th/5th CMC    Neurovascular Exam:  Digits WWP, brisk CR < 3s throughout  NVI motor/LTS to M/R/U nerves, radial pulse 2+    ROM hand full, painless, no rotational deformity    ROM wrist full, painless    ROM elbow full, painless    Abdomen not guarded  Respirations nonlabored  Perfusion intact    Diagnostic Results:     Imaging - I independently viewed the patient's imaging as well as the radiology report.  Xrays of the patient's right hand demonstrates 4th/5th CMC dislocation.    EMG - none    ASSESSMENT/PLAN:      21 y.o. yo male with Right 4th/5th CMC fracture/dislocation, 2 months from injury    Plan: The " patient and I had a thorough discussion today, xrays reviewed in detail. Surgical treatment is recommended, patient agrees. He is consented for Right CRPP vs ORIF 4th/5th cmc with Dr. Fox on 1/7/22.    The patient has not responded to adequate non operative treatment at this time and/or non operative treatment is not indicated. Thus, the risks, benefits and alternatives to surgery were discussed with the patient in detail.  Specific risks include but are not limited to bleeding, infection, vessel and/or nerve damage, pain, numbness, tingling, compartment syndrome, need for additional surgery, failure to return to pre-injury and/or preoperative functional status, inability to return to work, scar sensitivity, delayed healing, complex regional pain syndrome, weakness, pulley injury, tendon injury, bowstringing, partial and/or incomplete relief of symptoms, persistence of and/or worsening of symptoms, hardware and/or surgical failure, prominent and/or symptomatic hardware possibly necessitating future removal, osteomyelitis, amputation, loss of function, stiffness, rotational malalignment, functional debility, dysfunction, decreased  strength, need for prolonged postoperative rehabilitation, malunion, nonunion, deep venous thrombosis, pulmonary embolism, arthritis and death.  The patient states an understanding and wishes to proceed with surgery.   All questions were answered.  No guarantees were implied or stated.  Written informed consent was obtained.    Should the patient's symptoms worsen, persist, or fail to improve they should return for reevaluation and I would be happy to see them back anytime.           Please do not hesitate to reach out to us via email, phone, or MyChart with any questions, concerns, or feedback.

## 2022-01-04 ENCOUNTER — LAB VISIT (OUTPATIENT)
Dept: PRIMARY CARE CLINIC | Facility: CLINIC | Age: 22
End: 2022-01-04
Payer: COMMERCIAL

## 2022-01-04 DIAGNOSIS — Z01.818 PRE-OP TESTING: ICD-10-CM

## 2022-01-04 PROCEDURE — U0005 INFEC AGEN DETEC AMPLI PROBE: HCPCS | Performed by: PHYSICIAN ASSISTANT

## 2022-01-04 PROCEDURE — U0003 INFECTIOUS AGENT DETECTION BY NUCLEIC ACID (DNA OR RNA); SEVERE ACUTE RESPIRATORY SYNDROME CORONAVIRUS 2 (SARS-COV-2) (CORONAVIRUS DISEASE [COVID-19]), AMPLIFIED PROBE TECHNIQUE, MAKING USE OF HIGH THROUGHPUT TECHNOLOGIES AS DESCRIBED BY CMS-2020-01-R: HCPCS | Performed by: PHYSICIAN ASSISTANT

## 2022-01-05 LAB — SARS-COV-2 RNA RESP QL NAA+PROBE: NOT DETECTED

## 2022-01-06 ENCOUNTER — PATIENT MESSAGE (OUTPATIENT)
Dept: ADMINISTRATIVE | Facility: OTHER | Age: 22
End: 2022-01-06
Payer: COMMERCIAL

## 2022-01-06 ENCOUNTER — TELEPHONE (OUTPATIENT)
Dept: ORTHOPEDICS | Facility: CLINIC | Age: 22
End: 2022-01-06
Payer: COMMERCIAL

## 2022-01-06 DIAGNOSIS — S63.054A CMC (CARPOMETACARPAL JOINT) DISLOCATION, RIGHT, INITIAL ENCOUNTER: Primary | ICD-10-CM

## 2022-01-06 RX ORDER — OXYCODONE HYDROCHLORIDE 5 MG/1
5 TABLET ORAL EVERY 6 HOURS PRN
Qty: 20 TABLET | Refills: 0 | Status: SHIPPED | OUTPATIENT
Start: 2022-01-06 | End: 2022-03-24

## 2022-01-06 RX ORDER — IBUPROFEN 600 MG/1
600 TABLET ORAL 3 TIMES DAILY
Qty: 60 TABLET | Refills: 0 | Status: SHIPPED | OUTPATIENT
Start: 2022-01-06 | End: 2022-03-24

## 2022-01-06 RX ORDER — ACETAMINOPHEN 500 MG
500 TABLET ORAL EVERY 6 HOURS PRN
Qty: 60 TABLET | Refills: 0 | Status: SHIPPED | OUTPATIENT
Start: 2022-01-06 | End: 2022-03-24

## 2022-01-06 NOTE — TELEPHONE ENCOUNTER
M for the patient. Notified of 7 AM arrival time to the [facility], [facility] [address] [facility].  Requested confirmation.     Johana Isaac MS, OTC  Clinical Assistant to Dr. Ross Dunbar [facility]

## 2022-01-07 ENCOUNTER — HOSPITAL ENCOUNTER (OUTPATIENT)
Facility: HOSPITAL | Age: 22
Discharge: HOME | End: 2022-01-07
Attending: ORTHOPAEDIC SURGERY | Admitting: ORTHOPAEDIC SURGERY
Payer: COMMERCIAL

## 2022-01-07 ENCOUNTER — ANESTHESIA (OUTPATIENT)
Dept: SURGERY | Facility: HOSPITAL | Age: 22
End: 2022-01-07
Payer: COMMERCIAL

## 2022-01-07 DIAGNOSIS — S63.054A CMC (CARPOMETACARPAL JOINT) DISLOCATION, RIGHT, INITIAL ENCOUNTER: ICD-10-CM

## 2022-01-07 PROCEDURE — 63600175 PHARM REV CODE 636 W HCPCS: Performed by: SURGERY

## 2022-01-07 PROCEDURE — 63600175 PHARM REV CODE 636 W HCPCS: Performed by: STUDENT IN AN ORGANIZED HEALTH CARE EDUCATION/TRAINING PROGRAM

## 2022-01-07 PROCEDURE — 76942 PR U/S GUIDANCE FOR NEEDLE GUIDANCE: ICD-10-PCS | Mod: 26,,, | Performed by: STUDENT IN AN ORGANIZED HEALTH CARE EDUCATION/TRAINING PROGRAM

## 2022-01-07 PROCEDURE — 94761 N-INVAS EAR/PLS OXIMETRY MLT: CPT

## 2022-01-07 PROCEDURE — 76942 ECHO GUIDE FOR BIOPSY: CPT | Mod: 26,,, | Performed by: STUDENT IN AN ORGANIZED HEALTH CARE EDUCATION/TRAINING PROGRAM

## 2022-01-07 PROCEDURE — D9220A PRA ANESTHESIA: ICD-10-PCS | Mod: CRNA,,, | Performed by: NURSE ANESTHETIST, CERTIFIED REGISTERED

## 2022-01-07 PROCEDURE — 36000708 HC OR TIME LEV III 1ST 15 MIN: Performed by: ORTHOPAEDIC SURGERY

## 2022-01-07 PROCEDURE — 36000709 HC OR TIME LEV III EA ADD 15 MIN: Performed by: ORTHOPAEDIC SURGERY

## 2022-01-07 PROCEDURE — 25000003 PHARM REV CODE 250: Performed by: SURGERY

## 2022-01-07 PROCEDURE — 26686 TREAT HAND DISLOCATION: CPT | Mod: 22,RT,, | Performed by: ORTHOPAEDIC SURGERY

## 2022-01-07 PROCEDURE — C1713 ANCHOR/SCREW BN/BN,TIS/BN: HCPCS | Performed by: ORTHOPAEDIC SURGERY

## 2022-01-07 PROCEDURE — 63600175 PHARM REV CODE 636 W HCPCS: Performed by: PHYSICIAN ASSISTANT

## 2022-01-07 PROCEDURE — 26686: ICD-10-PCS | Mod: 22,RT,, | Performed by: ORTHOPAEDIC SURGERY

## 2022-01-07 PROCEDURE — 25000003 PHARM REV CODE 250: Performed by: PHYSICIAN ASSISTANT

## 2022-01-07 PROCEDURE — 63600175 PHARM REV CODE 636 W HCPCS: Performed by: NURSE ANESTHETIST, CERTIFIED REGISTERED

## 2022-01-07 PROCEDURE — 27201423 OPTIME MED/SURG SUP & DEVICES STERILE SUPPLY: Performed by: ORTHOPAEDIC SURGERY

## 2022-01-07 PROCEDURE — 99900035 HC TECH TIME PER 15 MIN (STAT)

## 2022-01-07 PROCEDURE — 37000008 HC ANESTHESIA 1ST 15 MINUTES: Performed by: ORTHOPAEDIC SURGERY

## 2022-01-07 PROCEDURE — 25000003 PHARM REV CODE 250: Performed by: NURSE ANESTHETIST, CERTIFIED REGISTERED

## 2022-01-07 PROCEDURE — D9220A PRA ANESTHESIA: ICD-10-PCS | Mod: ANES,,, | Performed by: STUDENT IN AN ORGANIZED HEALTH CARE EDUCATION/TRAINING PROGRAM

## 2022-01-07 PROCEDURE — D9220A PRA ANESTHESIA: Mod: ANES,,, | Performed by: STUDENT IN AN ORGANIZED HEALTH CARE EDUCATION/TRAINING PROGRAM

## 2022-01-07 PROCEDURE — 64415 NJX AA&/STRD BRCH PLXS IMG: CPT | Mod: 59,RT,, | Performed by: STUDENT IN AN ORGANIZED HEALTH CARE EDUCATION/TRAINING PROGRAM

## 2022-01-07 PROCEDURE — 71000033 HC RECOVERY, INTIAL HOUR: Performed by: ORTHOPAEDIC SURGERY

## 2022-01-07 PROCEDURE — 37000009 HC ANESTHESIA EA ADD 15 MINS: Performed by: ORTHOPAEDIC SURGERY

## 2022-01-07 PROCEDURE — 27800903 OPTIME MED/SURG SUP & DEVICES OTHER IMPLANTS: Performed by: ORTHOPAEDIC SURGERY

## 2022-01-07 PROCEDURE — 71000015 HC POSTOP RECOV 1ST HR: Performed by: ORTHOPAEDIC SURGERY

## 2022-01-07 PROCEDURE — 64415 NJX AA&/STRD BRCH PLXS IMG: CPT | Mod: 59 | Performed by: SURGERY

## 2022-01-07 PROCEDURE — D9220A PRA ANESTHESIA: Mod: CRNA,,, | Performed by: NURSE ANESTHETIST, CERTIFIED REGISTERED

## 2022-01-07 PROCEDURE — 64415 PR NERVE BLOCK INJ, ANES/STEROID, BRACHIAL PLEXUS, INCL IMAG GUIDANCE: ICD-10-PCS | Mod: 59,RT,, | Performed by: STUDENT IN AN ORGANIZED HEALTH CARE EDUCATION/TRAINING PROGRAM

## 2022-01-07 DEVICE — FIBER CORTICAL ENHANCE 2.5CC: Type: IMPLANTABLE DEVICE | Site: HAND | Status: FUNCTIONAL

## 2022-01-07 DEVICE — IMPLANTABLE DEVICE: Type: IMPLANTABLE DEVICE | Site: HAND | Status: FUNCTIONAL

## 2022-01-07 RX ORDER — FENTANYL CITRATE 50 UG/ML
25-200 INJECTION, SOLUTION INTRAMUSCULAR; INTRAVENOUS
Status: DISCONTINUED | OUTPATIENT
Start: 2022-01-07 | End: 2022-03-24

## 2022-01-07 RX ORDER — LIDOCAINE HYDROCHLORIDE 10 MG/ML
1 INJECTION, SOLUTION EPIDURAL; INFILTRATION; INTRACAUDAL; PERINEURAL ONCE AS NEEDED
Status: DISCONTINUED | OUTPATIENT
Start: 2022-01-07 | End: 2022-03-24

## 2022-01-07 RX ORDER — NEOSTIGMINE METHYLSULFATE 1 MG/ML
INJECTION, SOLUTION INTRAVENOUS
Status: DISCONTINUED | OUTPATIENT
Start: 2022-01-07 | End: 2022-01-12

## 2022-01-07 RX ORDER — ONDANSETRON 2 MG/ML
INJECTION INTRAMUSCULAR; INTRAVENOUS
Status: DISCONTINUED | OUTPATIENT
Start: 2022-01-07 | End: 2022-01-12

## 2022-01-07 RX ORDER — LIDOCAINE HYDROCHLORIDE 10 MG/ML
1 INJECTION, SOLUTION EPIDURAL; INFILTRATION; INTRACAUDAL; PERINEURAL ONCE
Status: DISCONTINUED | OUTPATIENT
Start: 2022-01-07 | End: 2022-03-24

## 2022-01-07 RX ORDER — LIDOCAINE HYDROCHLORIDE 20 MG/ML
INJECTION INTRAVENOUS
Status: DISCONTINUED | OUTPATIENT
Start: 2022-01-07 | End: 2022-01-12

## 2022-01-07 RX ORDER — CEFAZOLIN SODIUM 1 G/3ML
2 INJECTION, POWDER, FOR SOLUTION INTRAMUSCULAR; INTRAVENOUS
Status: COMPLETED | OUTPATIENT
Start: 2022-01-07 | End: 2022-01-07

## 2022-01-07 RX ORDER — ROCURONIUM BROMIDE 10 MG/ML
INJECTION, SOLUTION INTRAVENOUS
Status: DISCONTINUED | OUTPATIENT
Start: 2022-01-07 | End: 2022-01-12

## 2022-01-07 RX ORDER — MIDAZOLAM HYDROCHLORIDE 1 MG/ML
INJECTION, SOLUTION INTRAMUSCULAR; INTRAVENOUS
Status: DISCONTINUED | OUTPATIENT
Start: 2022-01-07 | End: 2022-01-12

## 2022-01-07 RX ORDER — SODIUM CHLORIDE 0.9 % (FLUSH) 0.9 %
10 SYRINGE (ML) INJECTION
Status: DISCONTINUED | OUTPATIENT
Start: 2022-01-07 | End: 2022-01-07 | Stop reason: HOSPADM

## 2022-01-07 RX ORDER — ACETAMINOPHEN 500 MG
1000 TABLET ORAL
Status: COMPLETED | OUTPATIENT
Start: 2022-01-07 | End: 2022-01-07

## 2022-01-07 RX ORDER — CELECOXIB 200 MG/1
400 CAPSULE ORAL ONCE
Status: COMPLETED | OUTPATIENT
Start: 2022-01-07 | End: 2022-01-07

## 2022-01-07 RX ORDER — DEXMEDETOMIDINE HYDROCHLORIDE 100 UG/ML
INJECTION, SOLUTION INTRAVENOUS
Status: DISCONTINUED | OUTPATIENT
Start: 2022-01-07 | End: 2022-01-12

## 2022-01-07 RX ORDER — HYDROCODONE BITARTRATE AND ACETAMINOPHEN 5; 325 MG/1; MG/1
1 TABLET ORAL EVERY 4 HOURS PRN
Status: DISCONTINUED | OUTPATIENT
Start: 2022-01-07 | End: 2022-01-07 | Stop reason: HOSPADM

## 2022-01-07 RX ORDER — PROPOFOL 10 MG/ML
VIAL (ML) INTRAVENOUS CONTINUOUS PRN
Status: DISCONTINUED | OUTPATIENT
Start: 2022-01-07 | End: 2022-01-12

## 2022-01-07 RX ORDER — MIDAZOLAM HYDROCHLORIDE 1 MG/ML
.5-4 INJECTION INTRAMUSCULAR; INTRAVENOUS
Status: DISCONTINUED | OUTPATIENT
Start: 2022-01-07 | End: 2022-03-24

## 2022-01-07 RX ORDER — ROPIVACAINE HYDROCHLORIDE 5 MG/ML
INJECTION, SOLUTION EPIDURAL; INFILTRATION; PERINEURAL
Status: COMPLETED | OUTPATIENT
Start: 2022-01-07 | End: 2022-01-07

## 2022-01-07 RX ORDER — PROPOFOL 10 MG/ML
VIAL (ML) INTRAVENOUS
Status: DISCONTINUED | OUTPATIENT
Start: 2022-01-07 | End: 2022-01-12

## 2022-01-07 RX ORDER — MUPIROCIN 20 MG/G
OINTMENT TOPICAL
Status: DISCONTINUED | OUTPATIENT
Start: 2022-01-07 | End: 2022-01-07 | Stop reason: HOSPADM

## 2022-01-07 RX ORDER — HALOPERIDOL 5 MG/ML
0.5 INJECTION INTRAMUSCULAR EVERY 10 MIN PRN
Status: DISCONTINUED | OUTPATIENT
Start: 2022-01-07 | End: 2022-01-07 | Stop reason: HOSPADM

## 2022-01-07 RX ADMIN — PROPOFOL 100 MG: 10 INJECTION, EMULSION INTRAVENOUS at 10:01

## 2022-01-07 RX ADMIN — CEFAZOLIN 2 G: 330 INJECTION, POWDER, FOR SOLUTION INTRAMUSCULAR; INTRAVENOUS at 09:01

## 2022-01-07 RX ADMIN — SODIUM CHLORIDE: 0.9 INJECTION, SOLUTION INTRAVENOUS at 08:01

## 2022-01-07 RX ADMIN — ROCURONIUM BROMIDE 20 MG: 10 INJECTION, SOLUTION INTRAVENOUS at 10:01

## 2022-01-07 RX ADMIN — MIDAZOLAM HYDROCHLORIDE 2 MG: 1 INJECTION, SOLUTION INTRAMUSCULAR; INTRAVENOUS at 09:01

## 2022-01-07 RX ADMIN — ONDANSETRON 4 MG: 2 INJECTION INTRAMUSCULAR; INTRAVENOUS at 11:01

## 2022-01-07 RX ADMIN — FENTANYL CITRATE 50 MCG: 50 INJECTION INTRAMUSCULAR; INTRAVENOUS at 08:01

## 2022-01-07 RX ADMIN — DEXMEDETOMIDINE HYDROCHLORIDE 8 MCG: 100 INJECTION, SOLUTION, CONCENTRATE INTRAVENOUS at 09:01

## 2022-01-07 RX ADMIN — GLYCOPYRROLATE 0.4 MG: 0.2 INJECTION, SOLUTION INTRAMUSCULAR; INTRAVITREAL at 11:01

## 2022-01-07 RX ADMIN — CELECOXIB 400 MG: 200 CAPSULE ORAL at 07:01

## 2022-01-07 RX ADMIN — GLYCOPYRROLATE 0.2 MG: 0.2 INJECTION, SOLUTION INTRAMUSCULAR; INTRAVITREAL at 09:01

## 2022-01-07 RX ADMIN — MUPIROCIN: 20 OINTMENT TOPICAL at 07:01

## 2022-01-07 RX ADMIN — LIDOCAINE HYDROCHLORIDE 100 MG: 20 INJECTION, SOLUTION INTRAVENOUS at 09:01

## 2022-01-07 RX ADMIN — ROPIVACAINE HYDROCHLORIDE 20 ML: 5 INJECTION, SOLUTION EPIDURAL; INFILTRATION; PERINEURAL at 08:01

## 2022-01-07 RX ADMIN — NEOSTIGMINE METHYLSULFATE 3 MG: 1 INJECTION INTRAVENOUS at 11:01

## 2022-01-07 RX ADMIN — MIDAZOLAM 2 MG: 1 INJECTION INTRAMUSCULAR; INTRAVENOUS at 08:01

## 2022-01-07 RX ADMIN — MEPIVACAINE HYDROCHLORIDE 10 ML: 15 INJECTION, SOLUTION EPIDURAL; INFILTRATION at 08:01

## 2022-01-07 RX ADMIN — ACETAMINOPHEN 1000 MG: 500 TABLET ORAL at 07:01

## 2022-01-07 RX ADMIN — PROPOFOL 100 MCG/KG/MIN: 10 INJECTION, EMULSION INTRAVENOUS at 09:01

## 2022-01-07 NOTE — ANESTHESIA PROCEDURE NOTES
Intubation    Date/Time: 1/7/2022 10:40 AM  Performed by: Debby Hernandez CRNA  Authorized by: Debby Hernandez CRNA     Intubation:     Induction:  Intravenous    Intubated:  Postinduction    Attempted By:  CRNA    Difficult Airway Encountered?: No      Complications:  None    Airway Device:  Supraglottic airway/LMA    Airway Device Size:  3.5    Style/Cuff Inflation:  Cuffed    Inflation Amount (mL):  0    Secured at:  The lips    Placement Verified By:  Capnometry    Complicating Factors:  None    Findings Post-Intubation:  Atraumatic/condition of teeth unchanged and BS equal bilateral

## 2022-01-07 NOTE — ANESTHESIA PROCEDURE NOTES
Supraclavicular ss    Patient location during procedure: pre-op   Block not for primary anesthetic.  Reason for block: at surgeon's request and post-op pain management   Post-op Pain Location: right hand  Start time: 1/7/2022 8:31 AM  Timeout: 1/7/2022 8:30 AM   End time: 1/7/2022 8:35 AM    Staffing  Authorizing Provider: Arias Gonsalez MD  Performing Provider: Conner Porter MD    Preanesthetic Checklist  Completed: patient identified, IV checked, site marked, risks and benefits discussed, surgical consent, monitors and equipment checked, pre-op evaluation and timeout performed  Peripheral Block  Patient position: supine  Prep: ChloraPrep  Patient monitoring: heart rate, cardiac monitor, continuous pulse ox, continuous capnometry and frequent blood pressure checks  Block type: supraclavicular  Laterality: right  Injection technique: single shot  Needle  Needle type: Stimuplex   Needle gauge: 22 G  Needle length: 2 in  Needle localization: anatomical landmarks and ultrasound guidance   -ultrasound image captured on disc.  Assessment  Injection assessment: negative aspiration, negative parasthesia and local visualized surrounding nerve  Paresthesia pain: none  Heart rate change: no  Slow fractionated injection: yes  Pain Tolerance: comfortable throughout block and no complaints  Medications:  Medication Administration Time: 1/7/2022 8:35 AM  Medications: mepivacaine (CARBOCAINE) injection 15 mg/mL (1.5%), 10 mL  ropivacaine (NAROPIN) injection 0.5%, 20 mL    Medications:  Bolus administered: 30 mL of 0.5 ropivacaine  Epinephrine added: 3.75 mcg/mL (1/300,000)  Additional Notes  Time out conducted. Site sivan confirmed with team and patient. Allergies reviewed.   Vital signs stable throughout block. RN monitoring vitals throughout.   Needle advanced under continuous ultrasound guidance.  1: 300k epinephrine added to local. Local injected incrementally after confirming negative aspiration. No signs or  symptoms of intravascular or intraneural injection noted.   No persistent paresthesias elicited or expressed. Patient tolerated procedure well.

## 2022-01-07 NOTE — BRIEF OP NOTE
[facility] [place] - Surgery (Hospital)  Brief Operative Note    Surgery Date: 1/7/2022     Surgeon(s) and Role:     * Mario Fox MD - Primary    Assisting Surgeon: None    Pre-op Diagnosis:  CMC (carpometacarpal joint) dislocation, right, initial encounter [S63.054A]    Post-op Diagnosis:  Post-Op Diagnosis Codes:     * CMC (carpometacarpal joint) dislocation, right, initial encounter [S63.054A]    Procedure(s) (LRB):  ORIF, FRACTURE, METACARPAL BONE - RIGHT 4th/5th CMC. Osteomed (Right)    Anesthesia: Regional    Operative Findings: see op note    Estimated Blood Loss: minimal         Specimens:   Specimen (24h ago, onward)            None            Discharge Note    OUTCOME: Patient tolerated treatment/procedure well without complication and is now ready for discharge.    DISPOSITION: Home or Self Care    FINAL DIAGNOSIS:  CMC (carpometacarpal joint) dislocation, right, initial encounter    FOLLOWUP: In clinic    DISCHARGE INSTRUCTIONS:    Discharge Procedure Orders   Diet general     Call MD for:  temperature >100.4     Call MD for:  persistent nausea and vomiting     Call MD for:  severe uncontrolled pain     Call MD for:  difficulty breathing, headache or visual disturbances     Call MD for:  redness, tenderness, or signs of infection (pain, swelling, redness, odor or green/yellow discharge around incision site)     Call MD for:  hives     Call MD for:  persistent dizziness or light-headedness     Call MD for:  extreme fatigue     Leave dressing on - Keep it clean, dry, and intact until clinic visit     Weight bearing restrictions (specify)   Order Comments: Non weight bearing operative extremity

## 2022-01-07 NOTE — TRANSFER OF CARE
"Anesthesia Transfer of Care Note    Patient: Osvaldo Don    Procedure(s) Performed: Procedure(s) (LRB):  ORIF, FRACTURE, METACARPAL BONE - RIGHT 4th/5th CMC. Osteomed (Right)    Patient location: PACU    Anesthesia Type: general    Transport from OR: Transported from OR on 6-10 L/min O2 by face mask with adequate spontaneous ventilation    Post pain: adequate analgesia    Post assessment: no apparent anesthetic complications    Post vital signs: stable    Level of consciousness: awake    Nausea/Vomiting: no nausea/vomiting    Complications: none    Transfer of care protocol was followed      Last vitals:   Visit Vitals  BP (!) 113/55   Pulse (!) 44   Temp 36.8 °C (98.2 °F) (Oral)   Resp (!) 32   Ht 5' 10" (1.778 m)   Wt 68 kg (150 lb)   SpO2 100%   BMI 21.52 kg/m²     "

## 2022-01-07 NOTE — PLAN OF CARE
VSS. Pt able to tolerate oral liquids.Pt denies c/o pain. Dressing and sling intact. Significant other received home meds per bedside delivery.  No distress noted. Pt states he is ready for D/C. D/C instructions reviewed with pt and significant other, verbalized understanding.

## 2022-01-07 NOTE — PLAN OF CARE
Spoke with pts mother via phone to provide updated on pt status, verbalized understanding. States she will send pts girlfriend to pick pt up.

## 2022-01-07 NOTE — PLAN OF CARE
Pre op complete. Questions answered. Resting comfortably. Call light in reach. Belongings placed in locker

## 2022-01-07 NOTE — INTERVAL H&P NOTE
The patient has been examined and the H&P has been reviewed:    I concur with the findings and no changes have occurred since H&P was written.    Surgery risks, benefits and alternative options discussed and understood by patient/family.    Injury 3 mos ago.  Pt reports intermittent N/T small finger.  NVI on exam with 4/5 ulnar motor strength.  Does have mildly pos wartenburg/SFE.  Will proceed with ORIF today.  Discussed possible spanning plate which may necessitate removal in the OR at another date.    The patient has not responded to adequate non operative treatment at this time and/or non operative treatment is not indicated. Thus, the risks, benefits and alternatives to surgery were discussed with the patient in detail.  Specific risks include but are not limited to bleeding, infection, vessel and/or nerve damage, pain, numbness, tingling, compartment syndrome, need for additional surgery, failure to return to pre-injury and/or preoperative functional status, inability to return to work, scar sensitivity, delayed healing, complex regional pain syndrome, weakness, pulley injury, tendon injury, bowstringing, partial and/or incomplete relief of symptoms, persistence of and/or worsening of symptoms, hardware and/or surgical failure, prominent and/or symptomatic hardware possibly necessitating future removal, osteomyelitis, amputation, loss of function, stiffness, rotational malalignment, functional debility, dysfunction, decreased  strength, need for prolonged postoperative rehabilitation, malunion, nonunion, deep venous thrombosis, pulmonary embolism, arthritis and death.  The patient states an understanding and wishes to proceed with surgery.   All questions were answered.  No guarantees were implied or stated.  Written informed consent was obtained.      Active Hospital Problems    Diagnosis  POA    *CMC (carpometacarpal joint) dislocation, right, initial encounter [A67.864J]  Unknown      Resolved Hospital  Problems   No resolved problems to display.

## 2022-01-08 ENCOUNTER — PATIENT MESSAGE (OUTPATIENT)
Dept: ORTHOPEDICS | Facility: CLINIC | Age: 22
End: 2022-01-08
Payer: COMMERCIAL

## 2022-01-10 VITALS
TEMPERATURE: 98 F | WEIGHT: 150 LBS | HEIGHT: 70 IN | BODY MASS INDEX: 21.47 KG/M2 | DIASTOLIC BLOOD PRESSURE: 85 MMHG | OXYGEN SATURATION: 100 % | SYSTOLIC BLOOD PRESSURE: 138 MMHG | HEART RATE: 46 BPM | RESPIRATION RATE: 19 BRPM

## 2022-01-10 NOTE — OP NOTE
DATE OF PROCEDURE: 1/7/2022     COVID-19 attestation:  Due to the nature of this patient's condition and/or the presence of pain, debilitty, and/or dysfunction, proceeding with surgery was indicated.  Furthermore, this patient was treated during the COVID-19 pandemic.  This was discussed with the patient, they are aware of our current policies and procedures, were given the option of delaying their surgery when applicable and if acceptable, and they elect to proceed.  Delaying this patient's surgery greater than 30 days would be detrimental to their care and functional outcome and/or cause additional suffering, pain, discomfort, and/or dysfunction/debility.  This was confirmed and we thus proceeded.       SERVICE:  Orthopedic Surgery.     SURGEON:  Mario Fox M.D.     FIRST ASSISTANT:  MD ANTWON Mckeon     PREOPERATIVE DIAGNOSIS:    right hamate fracture, delayed presentation  right 4th CMC dislocation, delayed presentation  right 5th CMC dislocation, delayed presentation     POSTOPERATIVE DIAGNOSIS:    Same     PROCEDURE(S) PERFORMED:    Open reduction internal fixation right hamate fracture with allogenous bone grafting  Open reduction internal fixation right 4th CMC dislocation  Open reduction internal fixation right 5th CMC dislocation  Twenty-two modifier     COMPLEX PROCEDURE:    There was an altered surgical field. There was abnormal anatomy. There was major scarring due to the delayed presentation and subacute nature of the case to the area around the the 4th and 5th CMC joints and the right hamate. Complexity of the service was much greater than the normative procedure.  There was increased time, intensity and technical difficulty of the procedure, severity of the patient's condition and mental effort required.  This was a highly complicated procedure that required advanced surgical skill in order to safely and technically perform it.  Nevertheless the outcome achieved was excellent.       TOURNIQUET  TIME:   2 hr and 10 minutes at 250mmHg.     ESTIMATED BLOOD LOSS:   5 mL.     IMPLANTS:   Osteomed 2.0 mm plates x2 with a combination of cortical and locking screws     COMPLICATIONS:  None.     PACKS AND DRAINS:  None.     PATHOLOGIC SPECIMENS:  None.     ANESTHESIA:  Regional mac     IV FLUIDS: Crystalloid.     CONDITION:  Stable.     MICROBIOLOGY: None.     Indications for Procedure:      The patient is a 21-year-old male who previously [facility] after sustaining significant trauma to his right hand.  He presented to clinic in a delayed fashion.  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  The patient has not responded to adequate non operative treatment at this time and/or non operative treatment is not indicated. Thus, the risks, benefits and alternatives to surgery were discussed with the patient in detail.  Specific risks include but are not limited to bleeding, infection, vessel and/or nerve damage, pain, numbness, tingling, compartment syndrome, need for additional surgery, failure to return to pre-injury and/or preoperative functional status, inability to return to work, scar sensitivity, delayed healing, complex regional pain syndrome, weakness, pulley injury, tendon injury, bowstringing, partial and/or incomplete relief of symptoms, persistence of and/or worsening of symptoms, hardware and/or surgical failure, prominent and/or symptomatic hardware possibly necessitating future removal, osteomyelitis, amputation, loss of function, stiffness, rotational malalignment, functional debility, dysfunction, decreased  strength, need for prolonged postoperative rehabilitation, malunion, nonunion, deep venous thrombosis, pulmonary embolism, arthritis and death.  The patient states an understanding and wishes to proceed with surgery.   All questions were answered.  No guarantees were implied or stated.  Written informed  consent was obtained.     Procedure in detail:     On the date of the operative intervention, the patient was evaluated in the preoperative holding area.  With their participation the right upper extremity was marked at the operative site.   The patient was then administered regional anesthesia and taken to the operating room where they were placed on OR table.  The right upper extremity extremity was then placed on a hand table with a nonsterile tourniquet placed high on the patient's right upper arm.  The right upper extremity extremity was then prepped and draped in the usual sterile fashion and time-out was taken and confirmed by all present to confirm the correct patient site procedure and administration of preoperative antibiotics if ordered.  All were in agreement so I proceeded.  I made an approximately 6 cm incision overlying the patient's dorsum of the right ulnar hand.  This was centered in between the 4th and 5th metacarpal bases spanning the hamate.  Esmarch was utilized to exsanguinate the right upper extremity tourniquet since the 250 mm mercury.  Skin incision was made sharply with a scalpel dissection was carried down through skin subcutaneous tissues.  Cutaneous nerves were protected identified retracted.  Dissection was continued to the level of the wrist capsule.  Extensor tender identified retracted out of harm's way.  Subperiosteal dissection was then continued from the 4th and 5th metacarpal shaft down to the bases and then extending proximally into a capsulotomy which was made overlying the hamate.  Upon doing so, I found a small displaced hamate fracture which was contributing to CMC instability and completely dislocated 4th and 5th CMC joints.  Given the chronicity of these dislocations, extensive dissection was necessary to reduce the 4th and 5th CMC joint.  I essentially had to perform circumferential subperiosteal dissection and releases to mobilize the CMC joints sufficiently.  The  complexity of this injury was much greater than the normative procedure.  There was increased time, intensity and technical difficulty of the procedure, severity of the patient's condition and mental effort required to get the joints reduced.     Sterile finger traps followed by weights were placed to the 4th and 5th rays to facilitate and hold the reduction.  These pulled the metacarpals out to length and kept the CMC dislocations reduced.  This took additional time and effort and was significantly more complex secondary to the subacute nature of the injuries.  At this time I was able to examine the dorsal aspect of the hamate.  There was significant damage and arthrosis there from these chronic dislocations and after reducing the CMC joints this left a very significant void where the metacarpals had been perched and where the hamate fracture fragment donor site was.  Decision was made at this time to fix and reconstruct the hamate fracture with cortical fiber allograft.  The void and defect was filled completely and this had excellent fill.  At this time the CMC joints remained reduced.  Attention was next turned towards fixation of these reduced dislocations and hamate fracture.  At this time, I chose two 2.0 mm Osteomed plates and placed these provisionally to span the 4th and 5th metacarpal base fractures with 1 plate on each fracture spanning this and then positioning this onto the dorsal hamate.  Great care was taken towards avoiding the triquetral hamate joint with the hardware.  Placed for provisionally secured with plate tacks and their position was checked with fluoroscopy which was found to be excellent and appropriate.  Cortical and locking screws were then placed for fixation of our plates into the 4th and 5th metacarpals with extension onto the hamate.   Hardware was very secure i and the dislocations were very stable after placement of our final hardware.  The wrist was taken through full range of  motion and there was no impingement of the hardware into any articular surface and the wrist had good range of motion.  Attention was then turned towards completion of the procedure.  The wound was copiously irrigated with sterile saline.  Joint capsule was closed with 0 Vicryl suture followed by 3 0 Vicryl and 4 0 nylon to close the skin.   All tendons were free after closure.  Sterile dressings were then applied consisting of Xeroform 4 x 4 gauze and a short-arm ulnar gutter splint.  Tourniquet was deflated brisk capillary refill in [place] throughout the entire right upper extremity.  There is no significant bleeding.  The patient was then awakened from anesthesia return the post anesthesia care unit stable condition.  There were no complications as attending surgeon was present performed the entire procedure.     Postop plan for the patient:  The patient be discharged home in stable condition.  The dressings stay on and remain clean dry and intact until follow-up.  He will be switched to a short-arm cast at the 2 week postop visit for gentle finger range of motion as tolerated.  He will be followed along for healing.  Plan will be for plate removal approximately 3 months postop.    Please be aware that this note has been generated with the assistance of [employer] voice-to-text.  Please excuse any spelling or grammatical errors.

## 2022-01-12 NOTE — TRANSFER OF CARE
"Anesthesia Transfer of Care Note    Patient: Osvaldo Don    Procedure(s) Performed: Procedure(s) (LRB):  ORIF, FRACTURE, METACARPAL BONE - RIGHT 4th/5th CMC. Osteomed (Right)    Patient location: PACU    Anesthesia Type: general    Transport from OR: Transported from OR on room air with adequate spontaneous ventilation    Post pain: adequate analgesia    Post assessment: no apparent anesthetic complications and tolerated procedure well    Post vital signs: stable    Level of consciousness: awake, alert and oriented    Nausea/Vomiting: no nausea/vomiting    Complications: none    Transfer of care protocol was followed      Last vitals:   Visit Vitals  /85 (BP Location: Right arm, Patient Position: Lying)   Pulse (!) 46   Temp 36.6 °C (97.9 °F) (Temporal)   Resp 19   Ht 5' 10" (1.778 m)   Wt 68 kg (150 lb)   SpO2 100%   BMI 21.52 kg/m²     "

## 2022-01-18 ENCOUNTER — OFFICE VISIT (OUTPATIENT)
Dept: ORTHOPEDICS | Facility: CLINIC | Age: 22
End: 2022-01-18
Payer: COMMERCIAL

## 2022-01-18 ENCOUNTER — HOSPITAL ENCOUNTER (OUTPATIENT)
Dept: RADIOLOGY | Facility: HOSPITAL | Age: 22
Discharge: HOME | End: 2022-01-18
Attending: PHYSICIAN ASSISTANT
Payer: COMMERCIAL

## 2022-01-18 VITALS — HEIGHT: 70 IN | WEIGHT: 147.69 LBS | BODY MASS INDEX: 21.14 KG/M2

## 2022-01-18 DIAGNOSIS — Z98.890 POSTOPERATIVE STATE: ICD-10-CM

## 2022-01-18 DIAGNOSIS — S63.054A CMC (CARPOMETACARPAL JOINT) DISLOCATION, RIGHT, INITIAL ENCOUNTER: Primary | ICD-10-CM

## 2022-01-18 DIAGNOSIS — S69.90XA INJURY OF HAND, UNSPECIFIED LATERALITY, INITIAL ENCOUNTER: ICD-10-CM

## 2022-01-18 DIAGNOSIS — S69.90XA INJURY OF HAND, UNSPECIFIED LATERALITY, INITIAL ENCOUNTER: Primary | ICD-10-CM

## 2022-01-18 PROCEDURE — 99024 PR POST-OP FOLLOW-UP VISIT: ICD-10-PCS | Mod: S$GLB,,, | Performed by: PHYSICIAN ASSISTANT

## 2022-01-18 PROCEDURE — 73130 X-RAY EXAM OF HAND: CPT | Mod: 26,RT,, | Performed by: RADIOLOGY

## 2022-01-18 PROCEDURE — 99024 POSTOP FOLLOW-UP VISIT: CPT | Mod: S$GLB,,, | Performed by: PHYSICIAN ASSISTANT

## 2022-01-18 PROCEDURE — 73130 XR HAND COMPLETE 3 VIEW RIGHT: ICD-10-PCS | Mod: 26,RT,, | Performed by: RADIOLOGY

## 2022-01-18 PROCEDURE — 99999 PR PBB SHADOW E&M-EST. PATIENT-LVL II: CPT | Mod: PBBFAC,,, | Performed by: PHYSICIAN ASSISTANT

## 2022-01-18 PROCEDURE — 73130 X-RAY EXAM OF HAND: CPT | Mod: TC,RT

## 2022-01-18 PROCEDURE — 3008F PR BODY MASS INDEX (BMI) DOCUMENTED: ICD-10-PCS | Mod: CPTII,S$GLB,, | Performed by: PHYSICIAN ASSISTANT

## 2022-01-18 PROCEDURE — 1159F MED LIST DOCD IN RCRD: CPT | Mod: CPTII,S$GLB,, | Performed by: PHYSICIAN ASSISTANT

## 2022-01-18 PROCEDURE — 1159F PR MEDICATION LIST DOCUMENTED IN MEDICAL RECORD: ICD-10-PCS | Mod: CPTII,S$GLB,, | Performed by: PHYSICIAN ASSISTANT

## 2022-01-18 PROCEDURE — 99999 PR PBB SHADOW E&M-EST. PATIENT-LVL II: ICD-10-PCS | Mod: PBBFAC,,, | Performed by: PHYSICIAN ASSISTANT

## 2022-01-18 PROCEDURE — 3008F BODY MASS INDEX DOCD: CPT | Mod: CPTII,S$GLB,, | Performed by: PHYSICIAN ASSISTANT

## 2022-01-18 RX ORDER — OXYCODONE AND ACETAMINOPHEN 5; 325 MG/1; MG/1
1 TABLET ORAL EVERY 4 HOURS PRN
Qty: 7 TABLET | Refills: 0 | Status: SHIPPED | OUTPATIENT
Start: 2022-01-18 | End: 2022-03-24

## 2022-01-18 NOTE — PROGRESS NOTES
Osvaldo Don presents for post-operative evaluation.  The patient is now 2 weeks s/p Right hand ORIF 4th and 5th CMC dislocation, ORIF hamate fracture with allogenous bone grafting with Dr. Fox on 1/7/22.  Overall the patient reports doing well.  Reports 8/10 pain outside of the splint today, does report pain at night. Denies any f/c/s, occasional numbness into the small finger that comes/goes. He takes Ibuprofen/Tylenol during the day, Percocet at night as needed.    PE:    AA&O x 4.  NAD  HEENT:  NCAT, sclera nonicteric  Lungs:  Respirations are equal and unlabored.  CV:  2+ bilateral upper and lower extremity pulses.  MSK: The wound is healing well with no signs of erythema or warmth.  There is no drainage.  No clinical signs or symptoms of infection are present.  Decreased extension and flexion of the small/ring fingers due to stiffness. SILT. DNVI.    A/P: Status post above, doing well  1) Transition to short arm fiberglass cast today. May have finger ROM, continue with NWB.  2) All sutures removed today. Percocet 5/325 #7 refilled today for PM pain.  3) F/U 4 weeks, xrays of right hand out of cast. Will transition to brace at that point and order OT. I will have him see Dr. Fox at 3 months postop to discuss hardware removal.  4) Call with any questions/concerns in the interim      Jamie Russo-DiGeorge PA-C

## 2022-01-18 NOTE — ANESTHESIA POSTPROCEDURE EVALUATION
Anesthesia Post Evaluation    Patient: Osvaldo Don    Procedure(s) Performed: Procedure(s) (LRB):  ORIF, FRACTURE, METACARPAL BONE - RIGHT 4th/5th CMC. Osteomed (Right)    Final Anesthesia Type: general      Patient location during evaluation: PACU  Patient participation: Yes- Able to Participate  Level of consciousness: awake and alert  Post-procedure vital signs: reviewed and stable  Pain management: adequate  Airway patency: patent    PONV status at discharge: No PONV  Anesthetic complications: no      Cardiovascular status: blood pressure returned to baseline  Respiratory status: spontaneous ventilation  Hydration status: euvolemic  Follow-up not needed.          Vitals Value Taken Time   /85 01/07/22 1300   Temp 36.6 °C (97.9 °F) 01/07/22 1305   Pulse 46 01/07/22 1300   Resp 19 01/07/22 1300   SpO2 100 % 01/07/22 1300         Event Time   Out of Recovery 12:35:00         Pain/Peg Score: No data recorded

## 2022-01-18 NOTE — PROGRESS NOTES
PT was placed in a RT short arm fiberglass cast. No complication or complaint reported. Ordered by J. Russo-Digeorge PA-C

## 2022-02-15 ENCOUNTER — OFFICE VISIT (OUTPATIENT)
Dept: ORTHOPEDICS | Facility: CLINIC | Age: 22
End: 2022-02-15
Payer: COMMERCIAL

## 2022-02-15 ENCOUNTER — HOSPITAL ENCOUNTER (OUTPATIENT)
Dept: RADIOLOGY | Facility: HOSPITAL | Age: 22
Discharge: HOME | End: 2022-02-15
Attending: PHYSICIAN ASSISTANT
Payer: COMMERCIAL

## 2022-02-15 VITALS — WEIGHT: 151.56 LBS | HEIGHT: 69 IN | BODY MASS INDEX: 22.45 KG/M2

## 2022-02-15 DIAGNOSIS — S63.054A CMC (CARPOMETACARPAL JOINT) DISLOCATION, RIGHT, INITIAL ENCOUNTER: ICD-10-CM

## 2022-02-15 DIAGNOSIS — S63.054A CMC (CARPOMETACARPAL JOINT) DISLOCATION, RIGHT, INITIAL ENCOUNTER: Primary | ICD-10-CM

## 2022-02-15 DIAGNOSIS — Z98.890 POSTOPERATIVE STATE: ICD-10-CM

## 2022-02-15 PROCEDURE — 99024 POSTOP FOLLOW-UP VISIT: CPT | Mod: S$GLB,,, | Performed by: PHYSICIAN ASSISTANT

## 2022-02-15 PROCEDURE — 99999 PR PBB SHADOW E&M-EST. PATIENT-LVL III: ICD-10-PCS | Mod: PBBFAC,,, | Performed by: PHYSICIAN ASSISTANT

## 2022-02-15 PROCEDURE — 73130 X-RAY EXAM OF HAND: CPT | Mod: TC,RT

## 2022-02-15 PROCEDURE — 99024 PR POST-OP FOLLOW-UP VISIT: ICD-10-PCS | Mod: S$GLB,,, | Performed by: PHYSICIAN ASSISTANT

## 2022-02-15 PROCEDURE — 73130 X-RAY EXAM OF HAND: CPT | Mod: 26,RT,, | Performed by: RADIOLOGY

## 2022-02-15 PROCEDURE — 3008F PR BODY MASS INDEX (BMI) DOCUMENTED: ICD-10-PCS | Mod: CPTII,S$GLB,, | Performed by: PHYSICIAN ASSISTANT

## 2022-02-15 PROCEDURE — 1159F MED LIST DOCD IN RCRD: CPT | Mod: CPTII,S$GLB,, | Performed by: PHYSICIAN ASSISTANT

## 2022-02-15 PROCEDURE — 1159F PR MEDICATION LIST DOCUMENTED IN MEDICAL RECORD: ICD-10-PCS | Mod: CPTII,S$GLB,, | Performed by: PHYSICIAN ASSISTANT

## 2022-02-15 PROCEDURE — 99999 PR PBB SHADOW E&M-EST. PATIENT-LVL III: CPT | Mod: PBBFAC,,, | Performed by: PHYSICIAN ASSISTANT

## 2022-02-15 PROCEDURE — 73130 XR HAND COMPLETE 3 VIEW RIGHT: ICD-10-PCS | Mod: 26,RT,, | Performed by: RADIOLOGY

## 2022-02-15 PROCEDURE — 3008F BODY MASS INDEX DOCD: CPT | Mod: CPTII,S$GLB,, | Performed by: PHYSICIAN ASSISTANT

## 2022-02-15 NOTE — PROGRESS NOTES
Osvaldo Don presents for post-operative evaluation.  The patient is now 6 weeks s/p Right hand ORIF 4th and 5th CMC dislocation, ORIF hamate fracture with allogenous bone grafting with Dr. Fox on 1/7/22.  Overall the patient reports doing well.  Reports 7/10 pain that comes/goes, he is taking Tylenol or Ibuprofen for this. Denies any f/c/s, occasional numbness into the small finger that comes/goes.     PE:    AA&O x 4.  NAD  HEENT:  NCAT, sclera nonicteric  Lungs:  Respirations are equal and unlabored.  CV:  2+ bilateral upper and lower extremity pulses.  MSK: The wound is healing well with no signs of erythema or warmth.  There is no drainage.  No clinical signs or symptoms of infection are present.  Full extension of the digits, near full flexion of the small finger. SILT. DNVI.    A/P: Status post above, doing well  1) Transition to wrist brace today. OT ordered for postop rehab. May have gentle ROM, may start strengthening in 2 weeks.  2) OK to continue with Tylenol/Ibuprofen prn and icing. Wound care and signs of infection discussed.  3) F/U 6 weeks with Dr. Fox to discuss hardware removal, repeat xrays.   4) Call with any questions/concerns in the interim      Jamie Russo-DiGeorge PA-C

## 2022-02-24 ENCOUNTER — CLINICAL SUPPORT (OUTPATIENT)
Dept: REHABILITATION | Facility: HOSPITAL | Age: 22
End: 2022-02-24
Payer: COMMERCIAL

## 2022-02-24 DIAGNOSIS — M25.631 DECREASED RANGE OF MOTION OF RIGHT WRIST: ICD-10-CM

## 2022-02-24 DIAGNOSIS — Z98.890 POSTOPERATIVE STATE: ICD-10-CM

## 2022-02-24 DIAGNOSIS — S63.054A CMC (CARPOMETACARPAL JOINT) DISLOCATION, RIGHT, INITIAL ENCOUNTER: ICD-10-CM

## 2022-02-24 DIAGNOSIS — M25.641 DECREASED RANGE OF MOTION OF FINGER OF RIGHT HAND: ICD-10-CM

## 2022-02-24 PROCEDURE — 97165 OT EVAL LOW COMPLEX 30 MIN: CPT

## 2022-02-24 PROCEDURE — 97110 THERAPEUTIC EXERCISES: CPT

## 2022-02-24 NOTE — PATIENT INSTRUCTIONS
"[facility], OCCUPATIONAL THERAPY  HOME EXERCISE PROGRAM     Complete the following two massages for 5 minutes, 2-3x/day:                                                       Complete the following exercises for 10 repetitions, 4x/day:       AROM: Supination / Pronation   With your elbow by your side, turn your   palm up then turn your palm down.      AROM: Wrist Flexion / Extension               Bend your wrist forward and back as far as possible.          AROM: Wrist Radial / Ulnar Deviation  Bend your wrist from side to side as far as possible.      AROM: DIP Flexion / Extension  Pinch middle knuckle to prevent bending.   Bend end knuckle until stretch is felt. Hold   3 seconds. Relax. Straighten finger as far as possible.      AROM: PIP Flexion / Extension  Pinch bottom knuckle  to prevent bending.   Actively bend middle knuckle until stretch is felt.   Hold 3 seconds. Relax. Straighten finger as far as possible.      AROM: Isolated PIP Flexion  Bend only middle joint of your finger,   keeping other fingers straight with other hand.      AROM: Isolated MCP Flexion / Extension ("Wave")   Bend only your large, bottom knuckles. Hold 3 seconds.   Keep the tips of your fingers straight. Straighten fingers.      AROM: Isolated IPJ Flexion / Extension ("Hook")   Bend only your middle and end knuckles. Hold 3 seconds.   Straighten your fingers.       AROM: MCP and PIP Flexion / Extension ("Straight Fist")  Bend your bottom and middle knuckles, keeping the tips of your fingers straight.   Try to touch the pads of your fingers on your palm. Hold 3 seconds.   Straighten your fingers.       AROM: Composite Flexion / Extension ("Full Fist")  Bend every joint in your hand into a fist. Hold 3 seconds.   Straighten your fingers.         AROM: Composte Extension ("Finger Lifts")  Lift your finger off of the table one at a time. Hold 3 seconds.   Relax your finger.      AROM: Abduction / Adduction  With hand flat " on table, spread all fingers apart,   then bring them together as close as possible.  Flexion (Passive)        With elbow resting on pad-ded surface, let wrist drop down. Apply gentle down-simpson push with fingers of other hand. Hold _30___ seconds.  Repeat __3__ times. Do ___3_ sessions per day.      Copyright © I. All rights reserved.          Hold for 30 sec, 3x      Therapist: Keanu Hamilton OT  2/24/2022

## 2022-02-28 ENCOUNTER — CLINICAL SUPPORT (OUTPATIENT)
Dept: REHABILITATION | Facility: HOSPITAL | Age: 22
End: 2022-02-28
Payer: COMMERCIAL

## 2022-02-28 DIAGNOSIS — M25.641 DECREASED RANGE OF MOTION OF FINGER OF RIGHT HAND: ICD-10-CM

## 2022-02-28 DIAGNOSIS — M25.631 DECREASED RANGE OF MOTION OF RIGHT WRIST: Primary | ICD-10-CM

## 2022-02-28 PROCEDURE — 97140 MANUAL THERAPY 1/> REGIONS: CPT

## 2022-02-28 PROCEDURE — 97110 THERAPEUTIC EXERCISES: CPT

## 2022-02-28 PROCEDURE — 97022 WHIRLPOOL THERAPY: CPT

## 2022-02-28 NOTE — PROGRESS NOTES
"  [facility]  Occupational Therapy Treatment Note    Date: 2/28/2022  Name: Osvaldo Don  Clinic Number: [id number]    Therapy Diagnosis:   Encounter Diagnoses   Name Primary?    Decreased range of motion of right wrist Yes    Decreased range of motion of finger of right hand      Physician: Russo-Digeorge, Jamie L*; Dr. Mario Fox    Physician Orders: evaluation and treat; gentle range of motion and strengthening in two weeks from 2/15/22; pt in wrist brace   Medical Diagnosis:   S63.054A (ICD-10-CM) - CMC (carpometacarpal joint) dislocation, right, initial encounter   Z98.890 (ICD-10-CM) - Postoperative stat      Surgical Procedure and Date: ORIF, FRACTURE, METACARPAL BONE - RIGHT 4th/5th CMC. Osteomed (Right) Open reduction internal fixation right hamate fracture with allogenous bone grafting  , 1/7/22 /   Date of Injury/Onset: about 4 months ago  Evaluation Date: 2/24/22  Insurance Authorization Period Expiration: 12/31/22  Plan of Care Expiration: 4/7/22  Progress Note Due: 3/24/22   Date of Return to MD: 3/24/22  Visit # / Visits authorized: 2 / 13  FOTO: initial eval      Precautions:  Standard and Weightbearing     Time In: 10:30  Time Out: 11:25  Total time:  55 minutes  Total Billable Time: 25 minutes      SUBJECTIVE     Pt reports: "it's doing ok."  He was compliant with home exercise program given last session.   Response to previous treatment: improving ROM  Functional change: cont to be limited, but tolerating HEP well    Pain: 0/10  Location: right hand/wrist     OBJECTIVE   Objective Measures updated at progress report unless specified.    Observation/Appearance: scar healed, intact, tight, min swelling      Edema. Measured in centimeters.    2/24/2022 2/24/2022     Left Right   Wrist Crease 16.0 16.7   MCPs 20.9 20         Elbow and Wrist ROM. Measured in degrees.    2/24/2022 2/24/2022     Left Right   Elbow Ext/Flex WNL WNL   Supination/Pronation WNL WNL   Wrist " Ext/Flex 63/66 45/43   Wrist RD/UD 17/26 10/16      Hand ROM. Measured in degrees.    2/24/2022 2/24/2022     Left Right   Small:  MP 88 54               PIP 95 95               DIP 64 58              PECK               Thumb:               Opposition Thumb to DPC Thumb to DPC       Strength (Dynamometer) and Pinch Strength (Pinch Gauge)  Measured in pounds.    2/24/2022 2/24/2022     Left Right   Rung II defer defer   Key Pinch       3pt Pinch       2pt Pinch          Sensation:     2/24/2022 2/24/2022     Left Right   Harrah Doron       Normal 1.65-2.83   x   Diminished Light Touch 3.22-3.61       Diminished Protective 3.84-4.31       Loss of Protective 4.56-6.65       Untestable >6.65       2 Point Discrimination       Static       Dynamic          Manual Muscle Test    2/24/2022 2/24/2022     Left Right   Wrist Extension  defer defer   Wrist Flexion       Radial Deviation       Ulnar Deviation       Supination       Pronation       Elbow Extension       Elbow Flexion              Treatment     april received the treatments listed below:     Supervised modalities after being cleared for contradictions: Fluidotherapy - Patient received fluidotherapy to right hand(s) for 10 minutes to increase blood flow, circulation, desensitization, sensory re-education and for pain management.        Manual therapy techniques: Manual Lymphatic Drainage and Soft tissue Mobilization were applied to the: R hand for 10 minutes, including:  Performed scar massage and retrograde massage to R hand, dorsal scar area  with and without scar massager to decrease adhesions and improve tensile glide.   Pt received kinesio taping to SF for rotation, and space corrector to ulnar aspect of hand.       Therapeutic exercises to develop ROM and flexibility for 35 minutes, including: (15 min supervised)    Exercises Reps/time   Gentle PROM of digits, composite flexion X 10 reps   Sup/pro; wrist flex/ext/RD/UD 2 ways  x 10 reps   IPJ  blocking of SF  x 10 reps   TGEs, spreads, lifts  x 10 reps each   Wrist flex and prayer stretch  x 2 reps    wrist AROM over wedge, 3 ways, med dowel X 20 reps each     wrist dexerciser X 2'    isospheres X 2'   Pinky scoops with small poms 2 containers   Hook stretch rolls with med roller X 20 reps         Patient Education and Home Exercises      Education provided:   - cont HEP, educated on kinesio taping purpose, precautions, and removal  -reviewed scar massage  - Progress towards goals     Written Home Exercises Provided: Patient instructed to cont prior HEP.  Exercises were reviewed and april was able to demonstrate them prior to the end of the session.  april demonstrated good  understanding of the HEP provided. See EMR under Patient Instructions for exercises provided during therapy sessions.      ASSESSMENT     Pt would continue to benefit from skilled OT. Pt tolerated tx well today. He presents today for first visit since initial evaluation. He cont to present with stiffness in wrist and SF. Rotation noted of SF with composite flexion, but able to be manually corrected. Able to fully bring fingers into fist, with tightness noted at end range. He participated well and motivated. ROM appears to be progressing. Will cont to address ROM.      april is progressing well towards his goals and there are no updates to goals at this time. Pt prognosis is Good.     Pt will continue to benefit from skilled outpatient occupational therapy to address the deficits listed in the problem list on initial evaluation, provide pt/family education and to maximize pt's level of independence in the home and community environment.     Pt's spiritual, cultural and educational needs considered and pt agreeable to plan of care and goals.    Anticipated barriers to occupational therapy: none at this time    Goals:  Long Term Goals (LTGs); to be met by discharge.  LTG #1: Pt will report a pain level of 1 out of 10 at worst with  functional activities ---progressing    LTG #2: Pt will demo improved FOTO score by 10 points. ---progressing  LTG #3: Pt will return to prior level of function for ADLs, household management, video games, and MMA training.---progressing     Short Term Goals (STGs); to be met within 4 weeks (3/24/22).  STG #1: Pt will report 3 out of 10 pain level at worst with functional activity participation.---progressing  STG #2: Pt will demo improved wrist edema by 0.3 cm to improve motion.---progressing  STG #3: Pt will demo improve right wrist PECK by 15 degrees to improve motion for ADL participation.---progressing  STG #4: Pt will demonstrate independence with issued HEP, scar/edema management, and wrist brace wear.---progressing  STG #5: Pt will demo improved SF PECK by 10 degrees needed to aid with improved functional grasp for MMA activities.---progressing  STG #6: Assess  and MMT when appropriate.---progressing      PLAN     Continue skilled occupational therapy with individualized plan of care focusing on improving functional use of his hand    Updates/Grading for next session: cont to progress with ROM    JOI Avalos, CHIDI

## 2022-03-04 ENCOUNTER — PATIENT MESSAGE (OUTPATIENT)
Dept: REHABILITATION | Facility: HOSPITAL | Age: 22
End: 2022-03-04
Payer: COMMERCIAL

## 2022-03-07 NOTE — PROGRESS NOTES
"  [facility]  Occupational Therapy Treatment Note    Date: 3/8/2022  Name: Osvaldo Don  Clinic Number: [id number]    Therapy Diagnosis:   Encounter Diagnoses   Name Primary?    Decreased range of motion of right wrist Yes    Decreased range of motion of finger of right hand      Physician: Russo-Digeorge, Jamie L*; Dr. Mario Fox    Physician Orders: evaluation and treat; gentle range of motion and strengthening in two weeks from 2/15/22; pt in wrist brace   Medical Diagnosis:   S63.054A (ICD-10-CM) - CMC (carpometacarpal joint) dislocation, right, initial encounter   Z98.890 (ICD-10-CM) - Postoperative stat      Surgical Procedure and Date: ORIF, FRACTURE, METACARPAL BONE - RIGHT 4th/5th CMC. Osteomed (Right) Open reduction internal fixation right hamate fracture with allogenous bone grafting  , 1/7/22 /   Date of Injury/Onset: about 4 months ago  Evaluation Date: 2/24/22  Insurance Authorization Period Expiration: 12/31/22  Plan of Care Expiration: 4/7/22  Progress Note Due: 3/24/22   Date of Return to MD: 3/24/22  Visit # / Visits authorized: 2 / 13  FOTO: initial eval      Precautions:  Standard and Weightbearing     Time In: 9:09  Time Out: 10:02 am  Total time:  57 minutes  Total Billable Time: 47minutes      SUBJECTIVE     Pt reports: "it's doing fine." He expressed still having some increased sensitivity at scar.  He was compliant with home exercise program given last session.   Response to previous treatment: improving range of motion of pinky   Functional change: cont to be limited, but tolerating HEP well    Pain: 0/10  Location: right hand/wrist     OBJECTIVE   Objective Measures updated at progress report unless specified.    Observation/Appearance: scar healed, intact, tight, min swelling      Edema. Measured in centimeters.    2/24/2022 2/24/2022     Left Right   Wrist Crease 16.0 16.7   MCPs 20.9 20         Elbow and Wrist ROM. Measured in degrees.    2/24/2022 " 2/24/2022     Left Right   Elbow Ext/Flex WNL WNL   Supination/Pronation WNL WNL   Wrist Ext/Flex 63/66 45/43   Wrist RD/UD 17/26 10/16      Hand ROM. Measured in degrees.    2/24/2022 2/24/2022     Left Right   Small:  MP 88 54               PIP 95 95               DIP 64 58              PECK               Thumb:               Opposition Thumb to DPC Thumb to DPC       Strength (Dynamometer) and Pinch Strength (Pinch Gauge)  Measured in pounds.    2/24/2022 2/24/2022     Left Right   Rung II defer defer   Key Pinch       3pt Pinch       2pt Pinch          Sensation:     2/24/2022 2/24/2022     Left Right   [address] [place]       Normal 1.65-2.83   x   Diminished Light Touch 3.22-3.61       Diminished Protective 3.84-4.31       Loss of Protective 4.56-6.65       Untestable >6.65       2 Point Discrimination       Static       Dynamic          Manual Muscle Test    2/24/2022 2/24/2022     Left Right   Wrist Extension  defer defer   Wrist Flexion       Radial Deviation       Ulnar Deviation       Supination       Pronation       Elbow Extension       Elbow Flexion              Treatment     april received the treatments listed below:     Supervised modalities after being cleared for contradictions: Fluidotherapy - Patient received fluidotherapy to right hand(s) for 10 minutes to increase blood flow, circulation, desensitization, sensory re-education and for pain management.      Manual therapy techniques: Manual Lymphatic Drainage and Soft tissue Mobilization were applied to the: R hand for 10 minutes, including:  Performed scar massage and retrograde massage to R hand, dorsal scar area  with and without scar massager to decrease adhesions and improve tensile glide.   Given justin strap for small finger to ring finger to correct ulnar deviation of small finger under ring finger.   Pt received kinesio taping to SF for rotation, and space corrector to ulnar aspect of hand. nt     Therapeutic exercises to develop ROM  and flexibility for 37 minutes, including:    Exercises Reps/time   Gentle PROM of digits, composite flexion X 10 reps   Sup/pro; wrist flex/ext/RD/UD 2 ways  x 10 reps   IPJ blocking of SF  x 10 reps   TGEs with justin strap, spreads with pink sponges , lifts  x 10 reps each   Wrist flex and prayer stretch  x 3 reps   Isometric wrist flex/ext/RD/UD X 10     wrist AROM over wedge with 1 # wt (min pain with UD) X 20 reps each     wrist dexerciser X 2'    isospheres X 2' nt   Pinky scoops with small poms 2 min   Hook stretch rolls with med roller X 20 reps nt     Patient Education and Home Exercises      Education provided:   - cont HEP pain-free, educated on justin strap use and purpose, precautions, and removal  -reviewed scar massage and massage to ulnar wrist at tender area  - Progress towards goals     Written Home Exercises Provided: yes.  Exercises were reviewed and april was able to demonstrate them prior to the end of the session.  april demonstrated good  understanding of the HEP provided. See EMR under Patient Instructions for exercises provided during therapy sessions.      ASSESSMENT     Pt would continue to benefit from skilled OT. Pt tolerated tx well today. Pt had some pain with UD at ulnar sided wrist. He presents today with improved small finger rom and swelling per clinical observation pre session and post session. He cont to present with stiffness in wrist and small finger mainly in the MP as well as small finger ulnar deviation with flat fist and comp flexion, but can be manually corrected with and without justin strap. Able to fully bring fingers into fist, with tightness noted at end range, but improved with stretching and exercise today. He participated well and motivated. Will cont to address range of motion and he left understanding pain-free participation in HEPs.    april is progressing well towards his goals and there are no updates to goals at this time. Pt prognosis is Good.     Pt  will continue to benefit from skilled outpatient occupational therapy to address the deficits listed in the problem list on initial evaluation, provide pt/family education and to maximize pt's level of independence in the home and community environment.     Pt's spiritual, cultural and educational needs considered and pt agreeable to plan of care and goals.    Anticipated barriers to occupational therapy: none at this time    Goals:  Long Term Goals (LTGs); to be met by discharge.  LTG #1: Pt will report a pain level of 1 out of 10 at worst with functional activities ---progressing    LTG #2: Pt will demo improved FOTO score by 10 points. ---progressing  LTG #3: Pt will return to prior level of function for ADLs, household management, video games, and MMA training.---progressing     Short Term Goals (STGs); to be met within 4 weeks (3/24/22).  STG #1: Pt will report 3 out of 10 pain level at worst with functional activity participation.---progressing  STG #2: Pt will demo improved wrist edema by 0.3 cm to improve motion.---progressing  STG #3: Pt will demo improve right wrist PECK by 15 degrees to improve motion for ADL participation.---progressing  STG #4: Pt will demonstrate independence with issued HEP, scar/edema management, and wrist brace wear.---progressing  STG #5: Pt will demo improved SF PECK by 10 degrees needed to aid with improved functional grasp for MMA activities.---progressing  STG #6: Assess  and MMT when appropriate.---progressing      PLAN     Continue skilled occupational therapy with individualized plan of care focusing on improving functional use of his hand    Updates/Grading for next session: cont to progress with range of motion; begin hand strengthening     Keanu Hamilton, OT

## 2022-03-08 ENCOUNTER — CLINICAL SUPPORT (OUTPATIENT)
Dept: REHABILITATION | Facility: HOSPITAL | Age: 22
End: 2022-03-08
Payer: COMMERCIAL

## 2022-03-08 DIAGNOSIS — M25.631 DECREASED RANGE OF MOTION OF RIGHT WRIST: Primary | ICD-10-CM

## 2022-03-08 DIAGNOSIS — M25.641 DECREASED RANGE OF MOTION OF FINGER OF RIGHT HAND: ICD-10-CM

## 2022-03-08 PROCEDURE — 97140 MANUAL THERAPY 1/> REGIONS: CPT

## 2022-03-08 PROCEDURE — 97022 WHIRLPOOL THERAPY: CPT

## 2022-03-08 PROCEDURE — 97110 THERAPEUTIC EXERCISES: CPT

## 2022-03-08 NOTE — PATIENT INSTRUCTIONS
[facility], OCCUPATIONAL THERAPY  HOME EXERCISE PROGRAM           Complete the following strengthening exercises 2x/day.  Hold each position x3 seconds then relax. Complete 10 repetitions each.     Resisted Wrist Extension   With forearm resting palm down, resist upward movement   of hand with other hand.       Resisted Wrist Flexion   With forearm resting palm up, resist upward movement   of hand with other hand.       Resisted Wrist Radial Deviation  With forearm resting with thumb up, use other hand to   resist upward movement of hand at wrist.       Resisted Wrist Ulnar Deviation  With forearm resting thumb up, use other hand to resist   downward movement of hand at wrist.    Copyright © [facility]. All rights reserved.             [facility], OCCUPATIONAL THERAPY  HOME EXERCISE PROGRAM      Complete the following stretches holding for 30 seconds per stretch. Complete 3 of each stretch, 3x/day.      PIP Flexion Stretch   Use other hand to bend the middle joint of your finger down as far as possible.      DIP Flexion Stretch   Use other hand to gently bend tip joint of your finger.         MP Flexion Stretch          Use other hand to gently bend the large knuckle of your finger.        Composite Flexion Stretch  Use other hand to bend your finger at all three joints.          Copyright © I. All rights reserved.

## 2022-03-11 ENCOUNTER — DOCUMENTATION ONLY (OUTPATIENT)
Dept: REHABILITATION | Facility: HOSPITAL | Age: 22
End: 2022-03-11
Payer: COMMERCIAL

## 2022-03-11 NOTE — PROGRESS NOTES
Occupational Therapy: No show  Date: 03/11/2022    Patient was a no show to today's OT appointment. Osvaldo Arian did not call to cancel nor reschedule. This is the 2 appointment that the patient has not attended. No charges have been posted today. Patient's next scheduled appointment is 3/14/22.     Cancel: 1  No show: 1    Therapist: Keanu Hamilton, OT

## 2022-03-14 ENCOUNTER — DOCUMENTATION ONLY (OUTPATIENT)
Dept: REHABILITATION | Facility: HOSPITAL | Age: 22
End: 2022-03-14
Payer: COMMERCIAL

## 2022-03-14 NOTE — PROGRESS NOTES
OT Not Seen Note    Date: 3/14/22    Pt did not show for scheduled OT visit this date. He did not cancel or reschedule. No charges dropped this date.     Cancel:1  No Show: 2    JOI Avalos CHT

## 2022-03-17 ENCOUNTER — PATIENT MESSAGE (OUTPATIENT)
Dept: REHABILITATION | Facility: HOSPITAL | Age: 22
End: 2022-03-17
Payer: COMMERCIAL

## 2022-03-23 ENCOUNTER — PATIENT MESSAGE (OUTPATIENT)
Dept: ADMINISTRATIVE | Facility: OTHER | Age: 22
End: 2022-03-23
Payer: COMMERCIAL

## 2022-03-23 NOTE — PROGRESS NOTES
"  [facility]  Occupational Therapy Treatment Note    Date: 3/24/2022  Name: Osvaldo Don  Clinic Number: [id number]    Therapy Diagnosis:   No diagnosis found.  Physician: Russo-Digeorge, Jamie L*; Dr. Mario Fox    Physician Orders: evaluation and treat; gentle range of motion and strengthening in two weeks from 2/15/22; pt in wrist brace   Medical Diagnosis:   S63.054A (ICD-10-CM) - CMC (carpometacarpal joint) dislocation, right, initial encounter   Z98.890 (ICD-10-CM) - Postoperative stat      Surgical Procedure and Date: ORIF, FRACTURE, METACARPAL BONE - RIGHT 4th/5th CMC. Osteomed (Right) Open reduction internal fixation right hamate fracture with allogenous bone grafting  , 1/7/22 /   Date of Injury/Onset: about 4 months ago  Evaluation Date: 2/24/22  Insurance Authorization Period Expiration: 12/31/22  Plan of Care Expiration: 4/7/22  Progress Note Due: 3/24/22   Date of Return to MD: 3/24/22  Visit # / Visits authorized: 2 / 13  FOTO: initial eval      Precautions:  Standard and Weightbearing     Time In: 9:09  Time Out: 10:02 am  Total time:  57 minutes  Total Billable Time: 47minutes      SUBJECTIVE     Pt reports: "it's doing fine." He expressed still having some increased sensitivity at scar.  He was compliant with home exercise program given last session.   Response to previous treatment: improving range of motion of pinky   Functional change: cont to be limited, but tolerating HEP well    Pain: 0/10  Location: right hand/wrist     OBJECTIVE   Objective Measures updated at progress report unless specified.    Observation/Appearance: scar healed, intact, tight, min swelling      Edema. Measured in centimeters.    2/24/2022 2/24/2022     Left Right   Wrist Crease 16.0 16.7   MCPs 20.9 20         Elbow and Wrist ROM. Measured in degrees.    2/24/2022 2/24/2022     Left Right   Elbow Ext/Flex WNL WNL   Supination/Pronation WNL WNL   Wrist Ext/Flex 63/66 45/43   Wrist RD/UD 17/26 " 10/16      Hand ROM. Measured in degrees.    2/24/2022 2/24/2022     Left Right   Small:  MP 88 54               PIP 95 95               DIP 64 58              PECK               Thumb:               Opposition Thumb to DPC Thumb to DPC       Strength (Dynamometer) and Pinch Strength (Pinch Gauge)  Measured in pounds.    2/24/2022 2/24/2022     Left Right   Rung II defer defer   Key Pinch       3pt Pinch       2pt Pinch          Sensation:     2/24/2022 2/24/2022     Left Right   [address] [place]       Normal 1.65-2.83   x   Diminished Light Touch 3.22-3.61       Diminished Protective 3.84-4.31       Loss of Protective 4.56-6.65       Untestable >6.65       2 Point Discrimination       Static       Dynamic          Manual Muscle Test    2/24/2022 2/24/2022     Left Right   Wrist Extension  defer defer   Wrist Flexion       Radial Deviation       Ulnar Deviation       Supination       Pronation       Elbow Extension       Elbow Flexion              Treatment     april received the treatments listed below:     Supervised modalities after being cleared for contradictions: Fluidotherapy - Patient received fluidotherapy to right hand(s) for 10 minutes to increase blood flow, circulation, desensitization, sensory re-education and for pain management.      Manual therapy techniques: Manual Lymphatic Drainage and Soft tissue Mobilization were applied to the: R hand for 10 minutes, including:  Performed scar massage and retrograde massage to R hand, dorsal scar area  with and without scar massager to decrease adhesions and improve tensile glide.   Given justin strap for small finger to ring finger to correct ulnar deviation of small finger under ring finger.   Pt received kinesio taping to SF for rotation, and space corrector to ulnar aspect of hand. nt     Therapeutic exercises to develop ROM and flexibility for 37 minutes, including:    Exercises Reps/time   Gentle PROM of digits, composite flexion X 10 reps   Sup/pro;  wrist flex/ext/RD/UD 2 ways  x 10 reps   IPJ blocking of SF  x 10 reps   TGEs with justin strap, spreads with pink sponges , lifts  x 10 reps each   Wrist flex and prayer stretch  x 3 reps   Isometric wrist flex/ext/RD/UD X 10     wrist AROM over wedge with 1 # wt (min pain with UD) X 20 reps each     wrist dexerciser X 2'    isospheres X 2' nt   Pinky scoops with small poms 2 min   Hook stretch rolls with med roller X 20 reps nt     Patient Education and Home Exercises      Education provided:   - cont HEP pain-free, educated on justin strap use and purpose, precautions, and removal  -reviewed scar massage and massage to ulnar wrist at tender area  - Progress towards goals     Written Home Exercises Provided: yes.  Exercises were reviewed and april was able to demonstrate them prior to the end of the session.  april demonstrated good  understanding of the HEP provided. See EMR under Patient Instructions for exercises provided during therapy sessions.      ASSESSMENT     Pt would continue to benefit from skilled OT. Pt tolerated tx well today. Pt had some pain with UD at ulnar sided wrist. He presents today with improved small finger rom and swelling per clinical observation pre session and post session. He cont to present with stiffness in wrist and small finger mainly in the MP as well as small finger ulnar deviation with flat fist and comp flexion, but can be manually corrected with and without justin strap. Able to fully bring fingers into fist, with tightness noted at end range, but improved with stretching and exercise today. He participated well and motivated. Will cont to address range of motion and he left understanding pain-free participation in HEPs.    april is progressing well towards his goals and there are no updates to goals at this time. Pt prognosis is Good.     Pt will continue to benefit from skilled outpatient occupational therapy to address the deficits listed in the problem list on initial  evaluation, provide pt/family education and to maximize pt's level of independence in the home and community environment.     Pt's spiritual, cultural and educational needs considered and pt agreeable to plan of care and goals.    Anticipated barriers to occupational therapy: none at this time    Goals:  Long Term Goals (LTGs); to be met by discharge.  LTG #1: Pt will report a pain level of 1 out of 10 at worst with functional activities ---progressing    LTG #2: Pt will demo improved FOTO score by 10 points. ---progressing  LTG #3: Pt will return to prior level of function for ADLs, household management, video games, and MMA training.---progressing     Short Term Goals (STGs); to be met within 4 weeks (3/24/22).  STG #1: Pt will report 3 out of 10 pain level at worst with functional activity participation.---progressing  STG #2: Pt will demo improved wrist edema by 0.3 cm to improve motion.---progressing  STG #3: Pt will demo improve right wrist PECK by 15 degrees to improve motion for ADL participation.---progressing  STG #4: Pt will demonstrate independence with issued HEP, scar/edema management, and wrist brace wear.---progressing  STG #5: Pt will demo improved SF PECK by 10 degrees needed to aid with improved functional grasp for MMA activities.---progressing  STG #6: Assess  and MMT when appropriate.---progressing      PLAN     Continue skilled occupational therapy with individualized plan of care focusing on improving functional use of his hand    Updates/Grading for next session: cont to progress with range of motion; begin hand strengthening     Keanu Hamilton, OT

## 2022-03-24 ENCOUNTER — CLINICAL SUPPORT (OUTPATIENT)
Dept: REHABILITATION | Facility: HOSPITAL | Age: 22
End: 2022-03-24
Payer: COMMERCIAL

## 2022-03-24 ENCOUNTER — OFFICE VISIT (OUTPATIENT)
Dept: FAMILY MEDICINE | Facility: CLINIC | Age: 22
End: 2022-03-24
Payer: COMMERCIAL

## 2022-03-24 VITALS
WEIGHT: 151.25 LBS | DIASTOLIC BLOOD PRESSURE: 60 MMHG | HEART RATE: 75 BPM | SYSTOLIC BLOOD PRESSURE: 102 MMHG | BODY MASS INDEX: 22.4 KG/M2 | TEMPERATURE: 98 F | OXYGEN SATURATION: 98 % | HEIGHT: 69 IN

## 2022-03-24 DIAGNOSIS — M25.631 DECREASED RANGE OF MOTION OF RIGHT WRIST: Primary | ICD-10-CM

## 2022-03-24 DIAGNOSIS — Z00.00 ANNUAL PHYSICAL EXAM: Primary | ICD-10-CM

## 2022-03-24 DIAGNOSIS — M25.641 DECREASED RANGE OF MOTION OF FINGER OF RIGHT HAND: ICD-10-CM

## 2022-03-24 DIAGNOSIS — Z98.890 H/O RIGHT WRIST SURGERY: ICD-10-CM

## 2022-03-24 PROCEDURE — 3078F PR MOST RECENT DIASTOLIC BLOOD PRESSURE < 80 MM HG: ICD-10-PCS | Mod: CPTII,S$GLB,, | Performed by: FAMILY MEDICINE

## 2022-03-24 PROCEDURE — 97110 THERAPEUTIC EXERCISES: CPT

## 2022-03-24 PROCEDURE — 3008F PR BODY MASS INDEX (BMI) DOCUMENTED: ICD-10-PCS | Mod: CPTII,S$GLB,, | Performed by: FAMILY MEDICINE

## 2022-03-24 PROCEDURE — 99999 PR PBB SHADOW E&M-EST. PATIENT-LVL III: ICD-10-PCS | Mod: PBBFAC,,, | Performed by: FAMILY MEDICINE

## 2022-03-24 PROCEDURE — 1160F RVW MEDS BY RX/DR IN RCRD: CPT | Mod: CPTII,S$GLB,, | Performed by: FAMILY MEDICINE

## 2022-03-24 PROCEDURE — 1159F MED LIST DOCD IN RCRD: CPT | Mod: CPTII,S$GLB,, | Performed by: FAMILY MEDICINE

## 2022-03-24 PROCEDURE — 3074F PR MOST RECENT SYSTOLIC BLOOD PRESSURE < 130 MM HG: ICD-10-PCS | Mod: CPTII,S$GLB,, | Performed by: FAMILY MEDICINE

## 2022-03-24 PROCEDURE — 3078F DIAST BP <80 MM HG: CPT | Mod: CPTII,S$GLB,, | Performed by: FAMILY MEDICINE

## 2022-03-24 PROCEDURE — 1160F PR REVIEW ALL MEDS BY PRESCRIBER/CLIN PHARMACIST DOCUMENTED: ICD-10-PCS | Mod: CPTII,S$GLB,, | Performed by: FAMILY MEDICINE

## 2022-03-24 PROCEDURE — 99395 PREV VISIT EST AGE 18-39: CPT | Mod: S$GLB,,, | Performed by: FAMILY MEDICINE

## 2022-03-24 PROCEDURE — 99999 PR PBB SHADOW E&M-EST. PATIENT-LVL III: CPT | Mod: PBBFAC,,, | Performed by: FAMILY MEDICINE

## 2022-03-24 PROCEDURE — 3008F BODY MASS INDEX DOCD: CPT | Mod: CPTII,S$GLB,, | Performed by: FAMILY MEDICINE

## 2022-03-24 PROCEDURE — 97022 WHIRLPOOL THERAPY: CPT

## 2022-03-24 PROCEDURE — 3074F SYST BP LT 130 MM HG: CPT | Mod: CPTII,S$GLB,, | Performed by: FAMILY MEDICINE

## 2022-03-24 PROCEDURE — 99395 PR PREVENTIVE VISIT,EST,18-39: ICD-10-PCS | Mod: S$GLB,,, | Performed by: FAMILY MEDICINE

## 2022-03-24 PROCEDURE — 1159F PR MEDICATION LIST DOCUMENTED IN MEDICAL RECORD: ICD-10-PCS | Mod: CPTII,S$GLB,, | Performed by: FAMILY MEDICINE

## 2022-03-24 NOTE — PROGRESS NOTES
"  [facility]  Occupational Therapy Progress Note     Date: 3/24/2022  Name: Osvaldo Don  Clinic Number: [id number]    Therapy Diagnosis:   Encounter Diagnoses   Name Primary?    Decreased range of motion of right wrist Yes    Decreased range of motion of finger of right hand      Physician: Russo-Digeorge, Jamie L*; Dr. Mario Fox    Physician Orders: evaluation and treat; gentle range of motion and strengthening in two weeks from 2/15/22; pt in wrist brace   Medical Diagnosis:   S63.054A (ICD-10-CM) - CMC (carpometacarpal joint) dislocation, right, initial encounter   Z98.890 (ICD-10-CM) - Postoperative stat      Surgical Procedure and Date: ORIF, FRACTURE, METACARPAL BONE - RIGHT 4th/5th CMC. Osteomed (Right) Open reduction internal fixation right hamate fracture with allogenous bone grafting  , 1/7/22 /   Date of Injury/Onset: about 4 months ago  Evaluation Date: 2/24/22  Insurance Authorization Period Expiration: 12/31/22  Plan of Care Expiration: 4/7/22  Progress Note Due: 3/24/22   Date of Return to MD: 3/24/22  Visit # / Visits authorized: 3 / 13  FOTO: initial eval      Precautions:  Standard and Weightbearing     Time In: 11:17 AM (pt arrived 15 min late to appt)   Time Out: 11:58 AM  Total time: 41 minutes  Total Billable Time: 41 minutes      SUBJECTIVE     Pt reports: "it's doing fine."   He was compliant with home exercise program given last session.   Response to previous treatment: improving range of motion of pinky   Functional change: cont to be limited, but tolerating HEP well    Pain: 0/10  Location: right hand/wrist     OBJECTIVE   Objective Measures updated at progress report unless specified.    Observation/Appearance: scar healed, intact, tight, min swelling      Edema. Measured in centimeters.    2/24/2022 2/24/2022     Left Right   Wrist Crease 16.0 16.7   MCPs 20.9 20         Elbow and Wrist ROM. Measured in degrees.    2/24/2022 2/24/2022 3/24/2022     Left " Right Right   Elbow Ext/Flex WNL WNL WNL   Supination/Pronation WNL WNL WNL   Wrist Ext/Flex 63/66 45/43 60/70   Wrist RD/UD 17/26 10/16 15/25      Hand ROM. Measured in degrees.    2/24/2022 2/24/2022 3/24/2022     Left Right Right   Small:  MP 88 54 60               PIP 95 95 90               DIP 64 58 60              PECK                 Thumb:                Opposition Thumb to DPC Thumb to DPC        Strength (Dynamometer) and Pinch Strength (Pinch Gauge)  Measured in pounds.     3/24/2022 3/24/2022     Left Right   Rung II 43 39   Love Pinch 12.5  11   3pt Pinch 13  10   2pt Pinch 11 11      Sensation:     2/24/2022 2/24/2022     Left Right   Yantis Doron       Normal 1.65-2.83   x      Manual Muscle Test    2/24/2022 2/24/2022     Left Right   Wrist Extension  defer 5/5   Wrist Flexion       Radial Deviation       Ulnar Deviation       Supination       Pronation       Elbow Extension       Elbow Flexion              Treatment     april received the treatments listed below:     Supervised modalities after being cleared for contradictions: Fluidotherapy - Patient received fluidotherapy to right hand(s) for 10 minutes to increase blood flow, circulation, desensitization, sensory re-education and for pain management.      (NT) Manual therapy techniques: Manual Lymphatic Drainage and Soft tissue Mobilization were applied to the: R hand for 10 minutes, including:   Performed scar massage and retrograde massage to R hand, dorsal scar area  with and without scar massager to decrease adhesions and improve tensile glide.     Therapeutic exercises to develop ROM and flexibility for 31 minutes, including:  Exercises Reps/time   Gentle PROM of digits, composite flexion X 10 reps   (NT) Sup/pro; wrist flex/ext/RD/UD 2 ways  x 10 reps   IPJ blocking of SF  x 10 reps   TGEs with justin strap, spreads with pink sponges , lifts  x 10 reps each   Wrist flex and prayer stretch  x 3 reps   Isometric wrist flex/ext/RD/UD X  10     wrist AROM over wedge with 1 # wt (min pain with UD) (NT) X 20 reps each     (NT) wrist dexerciser X 2'    (NT) isospheres X 2' nt   (NT) Pinky scoops with small poms 2 min   Hook stretch rolls with high lighter  X 20 reps    UPDATE: pink putty for HEP   - squeezing, scrapes, pinches, spreads x 10 each       Patient Education and Home Exercises      Education provided:   - cont HEP pain-free,   - putty with HEP    - Progress towards goals     Written Home Exercises Provided: yes.  Exercises were reviewed and april was able to demonstrate them prior to the end of the session.  april demonstrated good  understanding of the HEP provided. See EMR under Patient Instructions for exercises provided during therapy sessions.      ASSESSMENT     Pt would continue to benefit from skilled OT.  Pt tolerated tx well today. He participated well and motivated.  Min lateral rotation in SF with full composite fist. No pain with putty. Demonstrated extensor weakness. Will cont to address range of motion and he left understanding pain-free participation in HEPs.      april is progressing well towards his goals and there are no updates to goals at this time. Pt prognosis is Good.     Pt will continue to benefit from skilled outpatient occupational therapy to address the deficits listed in the problem list on initial evaluation, provide pt/family education and to maximize pt's level of independence in the home and community environment.     Pt's spiritual, cultural and educational needs considered and pt agreeable to plan of care and goals.    Anticipated barriers to occupational therapy: none at this time    Goals:  Long Term Goals (LTGs); to be met by discharge.  LTG #1: Pt will report a pain level of 1 out of 10 at worst with functional activities ---progressing    LTG #2: Pt will demo improved FOTO score by 10 points. ---progressing  LTG #3: Pt will return to prior level of function for ADLs, household management, video  games, and MMA training.---progressing     Short Term Goals (STGs); to be met within 4 weeks (3/24/22).  STG #1: Pt will report 3 out of 10 pain level at worst with functional activity participation.---progressing  STG #2: Pt will demo improved wrist edema by 0.3 cm to improve motion.---progressing  STG #3: Pt will demo improve right wrist PECK by 15 degrees to improve motion for ADL participation.---progressing  STG #4: Pt will demonstrate independence with issued HEP, scar/edema management, and wrist brace wear.---progressing  STG #5: Pt will demo improved SF PECK by 10 degrees needed to aid with improved functional grasp for MMA activities.---progressing  STG #6: Assess  and MMT when appropriate.---MET 3/24/2022       PLAN     Continue skilled occupational therapy with individualized plan of care focusing on improving functional use of his hand    Updates/Grading for next session: cont to progress with range of motion; cont hand strengthening     Estee Herbert, OT

## 2022-03-24 NOTE — PROGRESS NOTES
"  Physical Exam  /60   Pulse 75   Temp 98.2 °F (36.8 °C) (Oral)   Ht 5' 9" (1.753 m)   Wt 68.6 kg (151 lb 3.8 oz)   SpO2 98%   BMI 22.33 kg/m²      Office Visit    Patient Name: Osvaldo Don    : 2000  MRN: [id number]      Assessment/Plan:  Osvaldo Don is a 21 y.o. male who presents today for :    Annual physical exam  -     CBC Auto Differential; Future; Expected date: 2022  -     Comprehensive Metabolic Panel; Future; Expected date: 2022  H/O right wrist surgery - 2022 -   -anticipatory guidance provided with age appropriate preventative services discussed, healthy diet and regular physical exercise also discussed with patient. Continue with PT and f/u Ortho as needed.      Follow up PRN    This note was created by combination of typed  and MModal dictation.  Transcription errors may be present.  If there are any questions, please contact me.        ----------------------------------------------------------------------------------------------------------------------      HPI:  Patient Care Team:  Ayo Slater MD as PCP - General (Family Medicine)  Juliana Mcginnis MA (Inactive) as Care Coordinator    Osvaldo is a 21 y.o. male with      Patient Active Problem List   Diagnosis    Non-seasonal allergic rhinitis    CMC (carpometacarpal joint) dislocation, right, initial encounter    Decreased range of motion of right wrist    Decreased range of motion of finger of right hand         Osvaldo has PMHx as noted above and presents today for:  Hospital Follow Up and Annual      He is doing well and has no major complaints today. He recently fractured his R wrist s/p surgical correction - pain well controlled and doing well with PT. Health maintenance-wise, he is due for routine labs. He is traveling on a flight tomorrow and inquired about a rapid COVID test, which we do not currently do in our clinic and he was referred to an external site for the rapid test.  Otherwise, " "no other acute issues during this visit.          Answers for HPI/ROS submitted by the patient on 3/24/2022  activity change: No  unexpected weight change: No  neck pain: No  hearing loss: No  rhinorrhea: No  trouble swallowing: No  eye discharge: No  visual disturbance: No  chest tightness: No  wheezing: No  chest pain: No  palpitations: No  blood in stool: No  constipation: No  vomiting: No  diarrhea: No  polydipsia: No  polyuria: No  difficulty urinating: No  urgency: No  hematuria: No  joint swelling: No  arthralgias: No  headaches: No  weakness: No  confusion: No  dysphoric mood: No            Current Medications  Medications reviewed and updated.     No current outpatient medications on file.  No current facility-administered medications for this visit.    Past Surgical History:   Procedure Laterality Date    KNEE SURGERY      OPEN REDUCTION AND INTERNAL FIXATION (ORIF) OF FRACTURE OF METACARPAL BONE Right 1/7/2022    Procedure: ORIF, FRACTURE, METACARPAL BONE - RIGHT 4th/5th CMC. Osteomed;  Surgeon: Mario Fox MD;  Location: [facility];  Service: Orthopedics;  Laterality: Right;       Family History   Problem Relation Age of Onset    Lupus Maternal Aunt     Melanoma Neg Hx     Psoriasis Neg Hx        Social History     Socioeconomic History    Marital status: Significant Other   Occupational History    Occupation: Student   Tobacco Use    Smoking status: Never Smoker    Smokeless tobacco: Never Used   Substance and Sexual Activity    Alcohol use: No    Drug use: Yes     Types: Marijuana    Sexual activity: Yes     Partners: Female           Allergies   Review of patient's allergies indicates:   Allergen Reactions    Ultram [tramadol] Anxiety     Severe agitation             Review of Systems  See HPI      [unfilled]  /60   Pulse 75   Temp 98.2 °F (36.8 °C) (Oral)   Ht 5' 9" (1.753 m)   Wt 68.6 kg (151 lb 3.8 oz)   SpO2 98%   BMI 22.33 kg/m²     GEN: NAD, well developed, pleasant, " well nourished  HEENT: NCAT, PERRLA, EOMI, sclera clear, anicteric, bilateral ear exam wnl, O/P clear, MMM with no lesions  NECK: normal, supple with midline trachea, no LAD, no thyromegaly  LUNGS: CTAB, no w/r/r, no increased work of breathing   HEART: RRR, normal S1 and S2, no m/r/g, no edema  ABD: s/nt/nd, NABS  SKIN: normal turgor, no rashes  PSYCH: AOx3, appropriate mood and affect  MSK: warm/well perfused, normal ROM in all extremities, no c/c/e. Surgical scar on L wrist well healed  NEURO: normal without focal findings, CN II-XII are grossly intact.

## 2022-03-31 ENCOUNTER — DOCUMENTATION ONLY (OUTPATIENT)
Dept: REHABILITATION | Facility: HOSPITAL | Age: 22
End: 2022-03-31
Payer: COMMERCIAL

## 2022-03-31 NOTE — PROGRESS NOTES
Occupational Therapy: No show  Date: 03/31/2022    Patient was a no show to today's OT appointment. Osvaldojudit Don did not call to cancel nor reschedule. This is the 4 appointment that the patient has not attended. No charges have been posted today. Patient's next scheduled appointment is 4/4/22.     Cancel: 1  No show: 3    Therapist: Keanu Hamilton, OT

## 2022-04-27 ENCOUNTER — HOSPITAL ENCOUNTER (EMERGENCY)
Facility: HOSPITAL | Age: 22
End: 2022-04-28
Attending: EMERGENCY MEDICINE
Payer: COMMERCIAL

## 2022-04-27 DIAGNOSIS — S02.609A CLOSED FRACTURE OF MANDIBLE, UNSPECIFIED LATERALITY, UNSPECIFIED MANDIBULAR SITE, INITIAL ENCOUNTER: ICD-10-CM
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
## 2022-04-27 PROCEDURE — 99285 EMERGENCY DEPT VISIT HI MDM: CPT | Mod: 25

## 2022-04-27 PROCEDURE — 96365 THER/PROPH/DIAG IV INF INIT: CPT

## 2022-04-27 RX ORDER — CLINDAMYCIN PHOSPHATE 900 MG/50ML
900 INJECTION, SOLUTION INTRAVENOUS
Status: COMPLETED | OUTPATIENT
Start: 2022-04-27 | End: 2022-04-28

## 2022-04-28 VITALS
HEIGHT: 69 IN | SYSTOLIC BLOOD PRESSURE: 143 MMHG | RESPIRATION RATE: 15 BRPM | BODY MASS INDEX: 22.36 KG/M2 | WEIGHT: 151 LBS | TEMPERATURE: 100 F | OXYGEN SATURATION: 97 % | HEART RATE: 85 BPM | DIASTOLIC BLOOD PRESSURE: 95 MMHG

## 2022-04-28 LAB
ALBUMIN SERPL BCP-MCNC: 4.1 G/DL (ref 3.5–5.2)
ALP SERPL-CCNC: 48 U/L (ref 55–135)
ALT SERPL W/O P-5'-P-CCNC: 15 U/L (ref 10–44)
ANION GAP SERPL CALC-SCNC: 9 MMOL/L (ref 8–16)
AST SERPL-CCNC: 32 U/L (ref 10–40)
BASOPHILS # BLD AUTO: 0.03 K/UL (ref 0–0.2)
BASOPHILS NFR BLD: 0.2 % (ref 0–1.9)
BILIRUB SERPL-MCNC: 0.5 MG/DL (ref 0.1–1)
BUN SERPL-MCNC: 17 MG/DL (ref 6–20)
CALCIUM SERPL-MCNC: 8.7 MG/DL (ref 8.7–10.5)
CHLORIDE SERPL-SCNC: 104 MMOL/L (ref 95–110)
CO2 SERPL-SCNC: 23 MMOL/L (ref 23–29)
CREAT SERPL-MCNC: 0.9 MG/DL (ref 0.5–1.4)
DIFFERENTIAL METHOD: ABNORMAL
EOSINOPHIL # BLD AUTO: 0 K/UL (ref 0–0.5)
EOSINOPHIL NFR BLD: 0.1 % (ref 0–8)
ERYTHROCYTE [DISTWIDTH] IN BLOOD BY AUTOMATED COUNT: 11.5 % (ref 11.5–14.5)
EST. GFR  (AFRICAN AMERICAN): >60 ML/MIN/1.73 M^2
EST. GFR  (NON AFRICAN AMERICAN): >60 ML/MIN/1.73 M^2
GLUCOSE SERPL-MCNC: 135 MG/DL (ref 70–110)
HCT VFR BLD AUTO: 37.9 % (ref 40–54)
HGB BLD-MCNC: 12.9 G/DL (ref 14–18)
IMM GRANULOCYTES # BLD AUTO: 0.06 K/UL (ref 0–0.04)
IMM GRANULOCYTES NFR BLD AUTO: 0.3 % (ref 0–0.5)
INR PPP: 1.1 (ref 0.8–1.2)
LYMPHOCYTES # BLD AUTO: 1.1 K/UL (ref 1–4.8)
LYMPHOCYTES NFR BLD: 5.9 % (ref 18–48)
MCH RBC QN AUTO: 30.7 PG (ref 27–31)
MCHC RBC AUTO-ENTMCNC: 34 G/DL (ref 32–36)
MCV RBC AUTO: 90 FL (ref 82–98)
MONOCYTES # BLD AUTO: 1.1 K/UL (ref 0.3–1)
MONOCYTES NFR BLD: 5.7 % (ref 4–15)
NEUTROPHILS # BLD AUTO: 16.6 K/UL (ref 1.8–7.7)
NEUTROPHILS NFR BLD: 87.8 % (ref 38–73)
NRBC BLD-RTO: 0 /100 WBC
PLATELET # BLD AUTO: 179 K/UL (ref 150–450)
PMV BLD AUTO: 9.7 FL (ref 9.2–12.9)
POTASSIUM SERPL-SCNC: 3.7 MMOL/L (ref 3.5–5.1)
PROT SERPL-MCNC: 6.9 G/DL (ref 6–8.4)
PROTHROMBIN TIME: 11.4 SEC (ref 9–12.5)
RBC # BLD AUTO: 4.2 M/UL (ref 4.6–6.2)
SARS-COV-2 RDRP RESP QL NAA+PROBE: NEGATIVE
SODIUM SERPL-SCNC: 136 MMOL/L (ref 136–145)
WBC # BLD AUTO: 18.91 K/UL (ref 3.9–12.7)

## 2022-04-28 PROCEDURE — 25000003 PHARM REV CODE 250: Performed by: EMERGENCY MEDICINE

## 2022-04-28 PROCEDURE — 85610 PROTHROMBIN TIME: CPT | Performed by: EMERGENCY MEDICINE

## 2022-04-28 PROCEDURE — 85025 COMPLETE CBC W/AUTO DIFF WBC: CPT | Performed by: EMERGENCY MEDICINE

## 2022-04-28 PROCEDURE — 80053 COMPREHEN METABOLIC PANEL: CPT | Performed by: EMERGENCY MEDICINE

## 2022-04-28 PROCEDURE — U0002 COVID-19 LAB TEST NON-CDC: HCPCS | Performed by: EMERGENCY MEDICINE

## 2022-04-28 RX ADMIN — CLINDAMYCIN PHOSPHATE 900 MG: 900 INJECTION, SOLUTION INTRAVENOUS at 12:04

## 2022-04-28 NOTE — ED NOTES
Updated RN at [facility] on pt's condition & pending transfer to [facility]

## 2022-04-28 NOTE — ED NOTES
Pt arrived to ED with PEC in place. Pt wanded by security and dressed in paper scubs. ED tech at bedside for direct observation.
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
## 2022-04-28 NOTE — ED NOTES
Transfer acceptance to [facility] ED, Dr Favian Felix. Call report to [phone].

## 2022-07-06 DIAGNOSIS — M79.641 RIGHT HAND PAIN: Primary | ICD-10-CM

## 2022-07-08 VITALS
TEMPERATURE: 98 F | SYSTOLIC BLOOD PRESSURE: 116 MMHG | HEART RATE: 81 BPM | WEIGHT: 155 LBS | DIASTOLIC BLOOD PRESSURE: 53 MMHG | BODY MASS INDEX: 22.89 KG/M2 | OXYGEN SATURATION: 98 % | RESPIRATION RATE: 16 BRPM

## 2022-07-08 PROCEDURE — 99900041 HC LEFT WITHOUT BEING SEEN- EMERGENCY: Mod: ER

## 2022-07-08 RX ORDER — NAPROXEN 250 MG/1
500 TABLET ORAL
Status: DISCONTINUED | OUTPATIENT
Start: 2022-07-08 | End: 2022-07-09 | Stop reason: HOSPADM

## 2022-07-09 ENCOUNTER — HOSPITAL ENCOUNTER (EMERGENCY)
Facility: HOSPITAL | Age: 22
Discharge: LEFT BEFORE DISCHARGE | End: 2022-07-09
Attending: EMERGENCY MEDICINE
Payer: COMMERCIAL

## 2022-07-09 DIAGNOSIS — R60.9 SWELLING: ICD-10-CM
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
## 2023-03-28 ENCOUNTER — PATIENT MESSAGE (OUTPATIENT)
Dept: RESEARCH | Facility: HOSPITAL | Age: 23
End: 2023-03-28
Payer: COMMERCIAL

## 2023-04-26 ENCOUNTER — TELEPHONE (OUTPATIENT)
Dept: FAMILY MEDICINE | Facility: CLINIC | Age: 23
End: 2023-04-26

## 2023-07-06 ENCOUNTER — HOSPITAL ENCOUNTER (OUTPATIENT)
Dept: RADIOLOGY | Facility: HOSPITAL | Age: 23
Discharge: HOME | End: 2023-07-06
Attending: PHYSICIAN ASSISTANT
Payer: COMMERCIAL

## 2023-07-06 DIAGNOSIS — M79.641 RIGHT HAND PAIN: ICD-10-CM

## 2023-07-06 PROCEDURE — 73130 X-RAY EXAM OF HAND: CPT | Mod: 26,RT,, | Performed by: RADIOLOGY

## 2023-07-06 PROCEDURE — 73130 X-RAY EXAM OF HAND: CPT | Mod: TC,RT

## 2023-07-06 PROCEDURE — 73130 XR HAND COMPLETE 3 VIEW RIGHT: ICD-10-PCS | Mod: 26,RT,, | Performed by: RADIOLOGY

## 2023-12-04 ENCOUNTER — TELEPHONE (OUTPATIENT)
Dept: ORTHOPEDICS | Facility: CLINIC | Age: 23
End: 2023-12-04
Payer: COMMERCIAL

## 2023-12-04 NOTE — TELEPHONE ENCOUNTER
Tried calling the pt to reschedule his 12/06/23 appt with Hero GONZALEZ as she will not be available in the clinic after 2.00 pm. Unable to reach him and LVM to alternate number.

## 2025-03-06 ENCOUNTER — PATIENT OUTREACH (OUTPATIENT)
Dept: ADMINISTRATIVE | Facility: HOSPITAL | Age: 25
End: 2025-03-06
Payer: COMMERCIAL

## (undated) DEVICE — GLOVE BIOGEL SKINSENSE PI 7.0

## (undated) DEVICE — ELECTRODE REM PLYHSV RETURN 9

## (undated) DEVICE — SHEET DRAPE FAN-FOLDED 3/4

## (undated) DEVICE — APPLICATOR CHLORAPREP ORN 26ML

## (undated) DEVICE — SOL 9P NACL IRR PIC IL

## (undated) DEVICE — SEE MEDLINE ITEM 157173

## (undated) DEVICE — DRAPE MOBILE C-ARM

## (undated) DEVICE — Device

## (undated) DEVICE — BANDAGE ESMARK ELASTIC ST 4X9

## (undated) DEVICE — TOWEL OR DISP STRL BLUE 4/PK

## (undated) DEVICE — SUT 4-0 VICRYL / SH

## (undated) DEVICE — SPLINT PLASTER EXT FAST 4X15

## (undated) DEVICE — TOURNIQUET SB QC SP 18X4IN

## (undated) DEVICE — GAUZE SPONGE 4X4 12PLY

## (undated) DEVICE — PAD CAST SPECIALIST STRL 4

## (undated) DEVICE — UNDERGLOVES BIOGEL PI SZ 7 LF

## (undated) DEVICE — BRUSH SPONGE COMB. EZ SCRUBW/B

## (undated) DEVICE — GLOVE PI ULTRA TOUCH G SURGEON

## (undated) DEVICE — BANDAGE GAUZE 6PLY FLUFF 2X3

## (undated) DEVICE — UNDERGLOVES BIOGEL PI SIZE 8

## (undated) DEVICE — BOWL STERILE LARGE 32OZ

## (undated) DEVICE — DRESSING XEROFORM 1X8IN

## (undated) DEVICE — SLING ARM LARGE FOAM STRAP

## (undated) DEVICE — BANDAGE MATRIX HK LOOP 2IN 5YD

## (undated) DEVICE — PADDING CAST 4IN DELTA ROLL

## (undated) DEVICE — STOCKINETTE DBL PLY ST 4X

## (undated) DEVICE — GOWN SMARTGOWN LVL4 X-LONG XL

## (undated) DEVICE — TRAP DIGIT FINGER

## (undated) DEVICE — DRAPE STERI U-SHAPED 47X51IN

## (undated) DEVICE — BANDAGE MATRIX HK LOOP 4IN 5YD

## (undated) DEVICE — SUT ETHILON 4-0 PS2 18 BLK

## (undated) DEVICE — CORD BIPOLAR 12 FOOT

## (undated) DEVICE — PADDING CAST 2IN DELTA ROLL

## (undated) DEVICE — PAD PREP 50/CA

## (undated) DEVICE — DRAPE PLASTIC U 60X72